# Patient Record
Sex: MALE | Race: WHITE | HISPANIC OR LATINO
[De-identification: names, ages, dates, MRNs, and addresses within clinical notes are randomized per-mention and may not be internally consistent; named-entity substitution may affect disease eponyms.]

---

## 2018-04-14 ENCOUNTER — TRANSCRIPTION ENCOUNTER (OUTPATIENT)
Age: 68
End: 2018-04-14

## 2019-09-14 ENCOUNTER — TRANSCRIPTION ENCOUNTER (OUTPATIENT)
Age: 69
End: 2019-09-14

## 2021-12-31 ENCOUNTER — EMERGENCY (EMERGENCY)
Facility: HOSPITAL | Age: 71
LOS: 1 days | Discharge: HOME | End: 2021-12-31
Attending: EMERGENCY MEDICINE
Payer: MEDICARE

## 2021-12-31 VITALS
RESPIRATION RATE: 20 BRPM | DIASTOLIC BLOOD PRESSURE: 85 MMHG | WEIGHT: 210.1 LBS | SYSTOLIC BLOOD PRESSURE: 146 MMHG | HEART RATE: 72 BPM | HEIGHT: 70 IN | OXYGEN SATURATION: 98 % | TEMPERATURE: 99 F

## 2021-12-31 VITALS
HEART RATE: 71 BPM | SYSTOLIC BLOOD PRESSURE: 113 MMHG | DIASTOLIC BLOOD PRESSURE: 61 MMHG | OXYGEN SATURATION: 98 % | TEMPERATURE: 99 F | RESPIRATION RATE: 18 BRPM

## 2021-12-31 LAB
ALBUMIN SERPL ELPH-MCNC: 4.3 G/DL — SIGNIFICANT CHANGE UP (ref 3.3–5)
ALP SERPL-CCNC: 46 U/L — SIGNIFICANT CHANGE UP (ref 40–120)
ALT FLD-CCNC: 20 U/L — SIGNIFICANT CHANGE UP (ref 10–45)
ANION GAP SERPL CALC-SCNC: 12 MMOL/L — SIGNIFICANT CHANGE UP (ref 5–17)
AST SERPL-CCNC: 19 U/L — SIGNIFICANT CHANGE UP (ref 10–40)
BASOPHILS # BLD AUTO: 0.02 K/UL — SIGNIFICANT CHANGE UP (ref 0–0.2)
BASOPHILS NFR BLD AUTO: 0.4 % — SIGNIFICANT CHANGE UP (ref 0–2)
BILIRUB SERPL-MCNC: 0.5 MG/DL — SIGNIFICANT CHANGE UP (ref 0.2–1.2)
BLD GP AB SCN SERPL QL: NEGATIVE — SIGNIFICANT CHANGE UP
BUN SERPL-MCNC: 12 MG/DL — SIGNIFICANT CHANGE UP (ref 7–23)
CALCIUM SERPL-MCNC: 8.5 MG/DL — SIGNIFICANT CHANGE UP (ref 8.4–10.5)
CHLORIDE SERPL-SCNC: 104 MMOL/L — SIGNIFICANT CHANGE UP (ref 96–108)
CO2 SERPL-SCNC: 21 MMOL/L — LOW (ref 22–31)
CREAT SERPL-MCNC: 0.79 MG/DL — SIGNIFICANT CHANGE UP (ref 0.5–1.3)
EOSINOPHIL # BLD AUTO: 0.03 K/UL — SIGNIFICANT CHANGE UP (ref 0–0.5)
EOSINOPHIL NFR BLD AUTO: 0.6 % — SIGNIFICANT CHANGE UP (ref 0–6)
GLUCOSE SERPL-MCNC: 148 MG/DL — HIGH (ref 70–99)
HCT VFR BLD CALC: 39.3 % — SIGNIFICANT CHANGE UP (ref 39–50)
HGB BLD-MCNC: 13.3 G/DL — SIGNIFICANT CHANGE UP (ref 13–17)
IMM GRANULOCYTES NFR BLD AUTO: 0.2 % — SIGNIFICANT CHANGE UP (ref 0–1.5)
LYMPHOCYTES # BLD AUTO: 1.53 K/UL — SIGNIFICANT CHANGE UP (ref 1–3.3)
LYMPHOCYTES # BLD AUTO: 29.1 % — SIGNIFICANT CHANGE UP (ref 13–44)
MCHC RBC-ENTMCNC: 30.3 PG — SIGNIFICANT CHANGE UP (ref 27–34)
MCHC RBC-ENTMCNC: 33.8 GM/DL — SIGNIFICANT CHANGE UP (ref 32–36)
MCV RBC AUTO: 89.5 FL — SIGNIFICANT CHANGE UP (ref 80–100)
MONOCYTES # BLD AUTO: 0.67 K/UL — SIGNIFICANT CHANGE UP (ref 0–0.9)
MONOCYTES NFR BLD AUTO: 12.8 % — SIGNIFICANT CHANGE UP (ref 2–14)
NEUTROPHILS # BLD AUTO: 2.99 K/UL — SIGNIFICANT CHANGE UP (ref 1.8–7.4)
NEUTROPHILS NFR BLD AUTO: 56.9 % — SIGNIFICANT CHANGE UP (ref 43–77)
NRBC # BLD: 0 /100 WBCS — SIGNIFICANT CHANGE UP (ref 0–0)
PLATELET # BLD AUTO: 151 K/UL — SIGNIFICANT CHANGE UP (ref 150–400)
POTASSIUM SERPL-MCNC: 4.2 MMOL/L — SIGNIFICANT CHANGE UP (ref 3.5–5.3)
POTASSIUM SERPL-SCNC: 4.2 MMOL/L — SIGNIFICANT CHANGE UP (ref 3.5–5.3)
PROT SERPL-MCNC: 6.7 G/DL — SIGNIFICANT CHANGE UP (ref 6–8.3)
RBC # BLD: 4.39 M/UL — SIGNIFICANT CHANGE UP (ref 4.2–5.8)
RBC # FLD: 12.3 % — SIGNIFICANT CHANGE UP (ref 10.3–14.5)
RH IG SCN BLD-IMP: NEGATIVE — SIGNIFICANT CHANGE UP
SARS-COV-2 RNA SPEC QL NAA+PROBE: DETECTED
SODIUM SERPL-SCNC: 137 MMOL/L — SIGNIFICANT CHANGE UP (ref 135–145)
WBC # BLD: 5.25 K/UL — SIGNIFICANT CHANGE UP (ref 3.8–10.5)
WBC # FLD AUTO: 5.25 K/UL — SIGNIFICANT CHANGE UP (ref 3.8–10.5)

## 2021-12-31 PROCEDURE — 86900 BLOOD TYPING SEROLOGIC ABO: CPT

## 2021-12-31 PROCEDURE — 86850 RBC ANTIBODY SCREEN: CPT

## 2021-12-31 PROCEDURE — 36415 COLL VENOUS BLD VENIPUNCTURE: CPT

## 2021-12-31 PROCEDURE — 99284 EMERGENCY DEPT VISIT MOD MDM: CPT | Mod: CS,GC

## 2021-12-31 PROCEDURE — U0003: CPT

## 2021-12-31 PROCEDURE — 99283 EMERGENCY DEPT VISIT LOW MDM: CPT

## 2021-12-31 PROCEDURE — U0005: CPT

## 2021-12-31 PROCEDURE — 80053 COMPREHEN METABOLIC PANEL: CPT

## 2021-12-31 PROCEDURE — 85025 COMPLETE CBC W/AUTO DIFF WBC: CPT

## 2021-12-31 PROCEDURE — 86901 BLOOD TYPING SEROLOGIC RH(D): CPT

## 2021-12-31 NOTE — ED ADULT NURSE NOTE - NS ED NURSE DC INFO COMPLEXITY
Reason for Call:  Home Health Care    Orders are needed for this patient. PT    PT: Continue one more time this week, and then two times per week for three weeks.      OT:     Skilled Nursing:     Pt Provider: Dr. Lee    Phone Number Homecare Nurse can be reached at: [phone]    Can we leave a detailed message on this number? YES    Phone number patient can be reached at: Home number on file [phone] (home)    Best Time: Any time    Call taken on 5/19/2020 at 10:52 AM by Jeniffer Razo     Simple: Patient demonstrates quick and easy understanding

## 2021-12-31 NOTE — ED PROVIDER NOTE - OBJECTIVE STATEMENT
72yo M h.o HTN/HLD, IDDM presenting to ED after positive COVID-19 rapid test complaining of fatigue, non-productive cough and weakness. Also complains of 2-3 days of diarrhea. Pt still ambulatory with some decreased exercise tolerance. Wife noted pt looks very pale and was worried about anemia. Pt reports one episode of dark/black loose stool 3 days ago, otherwise denies GI bleeding. Good PO intake. No n/v, fever/chills, cp or sob. Pt unvaccinated for COVID-19 (personal choice).

## 2021-12-31 NOTE — ED PROVIDER NOTE - ATTENDING CONTRIBUTION TO CARE
Keny Snyder MD, FACEP: In this physician's medical judgement based on clinical history and physical exam: patient with chief complaint of COVID-19 with fatigue, cough, and weakness. Mild symptoms, patient ambulatory, and eating and drinking. Patient without lightheadedness or fatigue and brought to emergency department secondary to familial concern for COVID-19 infection and possible anemia  ncat  non-tachycardic  non-tachypneic  no gross deformity of extremities, no asymmetry  no edema  with capacity and insight  no rash/vesicles/petechiae  will get iv, cbc, cmp, COVID-19 screen  patient with mild symptoms of COVID-19  labs without signs of anemia   Patient does not fit current guidelines for MAB  The patient was serially evaluated throughout emergency department course. There was no acute deterioration up to this time in the department. Patient has demonstrated clinical improvement and is stable, feels better at this time according to emergency department team. Agree with goals/plan of emergency department care as described in this physician's electronic medical record, including diagnostics, therapeutics and consultation as clinically warranted. Will discharge home with close outpatient follow up with primary care physician/provider and specialist if necessary. The patient and/or family was educated on concerning signs and features to return to the emergency department, in layman terms, including but not limited to: nausea, vomiting, fever, chills, persistent/worsening symptoms or any concerns at all. No immediate life threatening issues present on history, clinical exam, or any diagnostic evaluation. The patient is a safe disposition home, has capacity and insight into their condition, is ambulatory in the Emergency Department with no further questions and will follow up with their doctor(s) this week. Diagnosis, prognosis, natural history and treatment was discussed with patient and/or family. The patient and/or family were given the opportunity to ask questions and have them answered in full. The patient and/or family are with capacity and insight into the situation, treatment, risks, benefits, alternative therapies, and understand that they can ask any further questions if needed. Patient and/or family/guardian understands anticipatory guidance and was given strict return and follow up precautions. The patient and/or family/guardian has been informed, in layman terms, of all concerning signs and symptoms to return to Emergency Department, the necessity to follow up with the PMD/Clinic/follow up provided within 2-3 days was explained, and the patient and/or family/guardian reports understanding of above with capacity and insight. The patient and/or family/guardian were informed of any results of their tests and are were encouraged to follow up on the findings with their doctor as well as the need to inform their doctor of any results. The patient and/or family/guardian are aware of the need to follow up with repeat testing as applicable and report understanding of the above with capacity and insight. The patient and/or family/guardian was made aware of any pending test results at the time of discharge and of the need to call back for the final results a well as the need to inform their doctor of the results.

## 2021-12-31 NOTE — ED PROVIDER NOTE - CLINICAL SUMMARY MEDICAL DECISION MAKING FREE TEXT BOX
70yo M unvaccinated for covid now with COVID-19 positive rapid test, cough and diarrhea, congestion, wife concerned for anemia w one episode of dark stools and normal colonoscopy last year. HD stable, no hypoxia, exam remarkable for clear lungs, no tachypnea, pale mucous membraines with no icterus. Will work up for anemia, symptoms for covid are mild, given no fever/hypoxia will likely d/c with crown follow up 70yo M unvaccinated for covid now with COVID-19 positive rapid test, cough and diarrhea, congestion, wife concerned for anemia w one episode of dark stools and normal colonoscopy last year. HD stable, no hypoxia, exam remarkable for clear lungs, no tachypnea, pale mucous membranes with no icterus. Will work up for anemia, symptoms for covid are mild, given no fever/hypoxia will likely d/c with crown follow up

## 2021-12-31 NOTE — ED ADULT NURSE NOTE - OBJECTIVE STATEMENT
70 yo presents to the ED from home. A&Ox4, ambulatory c/o fatigue, non-productive cough and weakness. positive COVID-19 rapid test. Also complains of 2-3 days of diarrhea. Pt still ambulatory with some decreased exercise tolerance. Wife noted pt looks very pale and was worried about anemia. Pt reports one episode of dark/black loose stool 3 days ago, otherwise denies GI bleeding. Good PO intake. No n/v, fever/chills, cp or sob. Pt unvaccinated for COVID-19 (personal choice). 20G RAC. Patient undressed and placed into gown, call bell in hand and side rails up for safety. warm blanket provided, vital signs stable, pt in no acute distress.

## 2021-12-31 NOTE — ED PROVIDER NOTE - ATTENDING WITH...
[SOB] : shortness of breath [Dyspnea on exertion] : dyspnea during exertion [Negative] : Heme/Lymph Resident

## 2021-12-31 NOTE — ED PROVIDER NOTE - PROGRESS NOTE DETAILS
Ye Vasquez, PGY-2: Pt reexamined at bedside, resting comfortably, vitals stable. H/H stable with no metabolic derangements, remains w normal O2 sat on RA. Will d/c home with [facility] follow up, pt is agreeable.

## 2021-12-31 NOTE — ED PROVIDER NOTE - PHYSICAL EXAMINATION
GENERAL: non-toxic appearing, alert, in NAD  HEENT: Mucous membranes moist but with pallor, atraumatic, normocephalic, Vision grossly intact, no conjunctivitis or discharge, hearing grossly intact,  no nasal discharge, epistaxis   CARDIAC: RRR, normal S1S2,  no appreciable murmurs, no cyanosis, cap refill < 2 seconds  PULM: normal work of breathing, oxygen saturation on RA wnl, CTAB, no crackles, rales, rhonchi, or wheezing  GI: abdomen nondistended, soft, nontender, no guarding or rebound tenderness, no palpable masses  NEURO: awake and alert, follows commands, normal speech, PERRLA, EOMI, no focal motor or sensory deficits  MSK: spine appears normal, no joint swelling or erythema, ranging all extremities with no appreciable loss of ROM  EXT: no peripheral edema, calf tenderness, redness or swelling  SKIN: cap refill >6s, warm, dry, and intact, no rashes  PSYCH: appropriate mood and affect

## 2021-12-31 NOTE — ED PROVIDER NOTE - PATIENT PORTAL LINK FT
You can access the FollowMyHealth Patient Portal offered by [facility] by registering at the following website: [email or web address]. By joining [facility]’s FollowMyHealth portal, you will also be able to view your health information using other applications (apps) compatible with our system.

## 2021-12-31 NOTE — ED PROVIDER NOTE - NSFOLLOWUPINSTRUCTIONS_ED_ALL_ED_FT
Monitor your home oxygen levels as discussed      - Return if O2 saturation drops below 90%      - Quarantine until asymptomatic for 3 days      - Return if symptoms are not improving or if new concerning symptoms arise    Over-the-Counter Medications:     Tylenol and Advil as needed every 4 hours for Headache and Fever    You will be contacted by the [facility] program for COVID-19 follow up.

## 2022-01-01 PROBLEM — Z00.00 ENCOUNTER FOR PREVENTIVE HEALTH EXAMINATION: Status: ACTIVE | Noted: 2022-01-01

## 2022-01-05 ENCOUNTER — INPATIENT (INPATIENT)
Facility: HOSPITAL | Age: 72
LOS: 5 days | Discharge: HOME | DRG: 177 | End: 2022-01-11
Attending: HOSPITALIST | Admitting: STUDENT IN AN ORGANIZED HEALTH CARE EDUCATION/TRAINING PROGRAM
Payer: MEDICARE

## 2022-01-05 VITALS
TEMPERATURE: 99 F | RESPIRATION RATE: 18 BRPM | HEIGHT: 67 IN | WEIGHT: 210.1 LBS | HEART RATE: 96 BPM | DIASTOLIC BLOOD PRESSURE: 75 MMHG | SYSTOLIC BLOOD PRESSURE: 139 MMHG | OXYGEN SATURATION: 93 %

## 2022-01-05 DIAGNOSIS — R79.89 OTHER SPECIFIED ABNORMAL FINDINGS OF BLOOD CHEMISTRY: ICD-10-CM

## 2022-01-05 DIAGNOSIS — R19.7 DIARRHEA, UNSPECIFIED: ICD-10-CM

## 2022-01-05 DIAGNOSIS — R09.02 HYPOXEMIA: ICD-10-CM

## 2022-01-05 DIAGNOSIS — E11.9 TYPE 2 DIABETES MELLITUS WITHOUT COMPLICATIONS: ICD-10-CM

## 2022-01-05 DIAGNOSIS — R06.00 DYSPNEA, UNSPECIFIED: ICD-10-CM

## 2022-01-05 DIAGNOSIS — Z29.9 ENCOUNTER FOR PROPHYLACTIC MEASURES, UNSPECIFIED: ICD-10-CM

## 2022-01-05 LAB
ALBUMIN SERPL ELPH-MCNC: 3.9 G/DL — SIGNIFICANT CHANGE UP (ref 3.3–5)
ALP SERPL-CCNC: 46 U/L — SIGNIFICANT CHANGE UP (ref 40–120)
ALT FLD-CCNC: 16 U/L — SIGNIFICANT CHANGE UP (ref 10–45)
ANION GAP SERPL CALC-SCNC: 14 MMOL/L — SIGNIFICANT CHANGE UP (ref 5–17)
AST SERPL-CCNC: 20 U/L — SIGNIFICANT CHANGE UP (ref 10–40)
BASOPHILS # BLD AUTO: 0.01 K/UL — SIGNIFICANT CHANGE UP (ref 0–0.2)
BASOPHILS NFR BLD AUTO: 0.2 % — SIGNIFICANT CHANGE UP (ref 0–2)
BILIRUB SERPL-MCNC: 0.2 MG/DL — SIGNIFICANT CHANGE UP (ref 0.2–1.2)
BUN SERPL-MCNC: 19 MG/DL — SIGNIFICANT CHANGE UP (ref 7–23)
CALCIUM SERPL-MCNC: 8.5 MG/DL — SIGNIFICANT CHANGE UP (ref 8.4–10.5)
CHLORIDE SERPL-SCNC: 102 MMOL/L — SIGNIFICANT CHANGE UP (ref 96–108)
CO2 SERPL-SCNC: 20 MMOL/L — LOW (ref 22–31)
CREAT SERPL-MCNC: 0.96 MG/DL — SIGNIFICANT CHANGE UP (ref 0.5–1.3)
EOSINOPHIL # BLD AUTO: 0 K/UL — SIGNIFICANT CHANGE UP (ref 0–0.5)
EOSINOPHIL NFR BLD AUTO: 0 % — SIGNIFICANT CHANGE UP (ref 0–6)
GLUCOSE SERPL-MCNC: 171 MG/DL — HIGH (ref 70–99)
HCT VFR BLD CALC: 37.2 % — LOW (ref 39–50)
HGB BLD-MCNC: 12.5 G/DL — LOW (ref 13–17)
IMM GRANULOCYTES NFR BLD AUTO: 0.5 % — SIGNIFICANT CHANGE UP (ref 0–1.5)
LYMPHOCYTES # BLD AUTO: 1.49 K/UL — SIGNIFICANT CHANGE UP (ref 1–3.3)
LYMPHOCYTES # BLD AUTO: 23.1 % — SIGNIFICANT CHANGE UP (ref 13–44)
MCHC RBC-ENTMCNC: 30.4 PG — SIGNIFICANT CHANGE UP (ref 27–34)
MCHC RBC-ENTMCNC: 33.6 GM/DL — SIGNIFICANT CHANGE UP (ref 32–36)
MCV RBC AUTO: 90.5 FL — SIGNIFICANT CHANGE UP (ref 80–100)
MONOCYTES # BLD AUTO: 0.78 K/UL — SIGNIFICANT CHANGE UP (ref 0–0.9)
MONOCYTES NFR BLD AUTO: 12.1 % — SIGNIFICANT CHANGE UP (ref 2–14)
NEUTROPHILS # BLD AUTO: 4.13 K/UL — SIGNIFICANT CHANGE UP (ref 1.8–7.4)
NEUTROPHILS NFR BLD AUTO: 64.1 % — SIGNIFICANT CHANGE UP (ref 43–77)
NRBC # BLD: 0 /100 WBCS — SIGNIFICANT CHANGE UP (ref 0–0)
PLATELET # BLD AUTO: 144 K/UL — LOW (ref 150–400)
POTASSIUM SERPL-MCNC: 4 MMOL/L — SIGNIFICANT CHANGE UP (ref 3.5–5.3)
POTASSIUM SERPL-SCNC: 4 MMOL/L — SIGNIFICANT CHANGE UP (ref 3.5–5.3)
PROT SERPL-MCNC: 6.6 G/DL — SIGNIFICANT CHANGE UP (ref 6–8.3)
RBC # BLD: 4.11 M/UL — LOW (ref 4.2–5.8)
RBC # FLD: 12.1 % — SIGNIFICANT CHANGE UP (ref 10.3–14.5)
SODIUM SERPL-SCNC: 136 MMOL/L — SIGNIFICANT CHANGE UP (ref 135–145)
WBC # BLD: 6.44 K/UL — SIGNIFICANT CHANGE UP (ref 3.8–10.5)
WBC # FLD AUTO: 6.44 K/UL — SIGNIFICANT CHANGE UP (ref 3.8–10.5)

## 2022-01-05 PROCEDURE — 99285 EMERGENCY DEPT VISIT HI MDM: CPT | Mod: CS

## 2022-01-05 PROCEDURE — 99223 1ST HOSP IP/OBS HIGH 75: CPT | Mod: GC

## 2022-01-05 PROCEDURE — 71045 X-RAY EXAM CHEST 1 VIEW: CPT | Mod: 26

## 2022-01-05 RX ORDER — ENOXAPARIN SODIUM 100 MG/ML
40 INJECTION SUBCUTANEOUS EVERY 12 HOURS
Refills: 0 | Status: DISCONTINUED | OUTPATIENT
Start: 2022-01-05 | End: 2022-01-11

## 2022-01-05 RX ORDER — ATORVASTATIN CALCIUM 80 MG/1
40 TABLET, FILM COATED ORAL AT BEDTIME
Refills: 0 | Status: DISCONTINUED | OUTPATIENT
Start: 2022-01-05 | End: 2022-01-11

## 2022-01-05 RX ORDER — ACETAMINOPHEN 500 MG
650 TABLET ORAL EVERY 6 HOURS
Refills: 0 | Status: DISCONTINUED | OUTPATIENT
Start: 2022-01-05 | End: 2022-01-05

## 2022-01-05 RX ORDER — GLUCAGON INJECTION, SOLUTION 0.5 MG/.1ML
1 INJECTION, SOLUTION SUBCUTANEOUS ONCE
Refills: 0 | Status: DISCONTINUED | OUTPATIENT
Start: 2022-01-05 | End: 2022-01-11

## 2022-01-05 RX ORDER — DEXTROSE 50 % IN WATER 50 %
25 SYRINGE (ML) INTRAVENOUS ONCE
Refills: 0 | Status: DISCONTINUED | OUTPATIENT
Start: 2022-01-05 | End: 2022-01-11

## 2022-01-05 RX ORDER — REMDESIVIR 5 MG/ML
200 INJECTION INTRAVENOUS EVERY 24 HOURS
Refills: 0 | Status: COMPLETED | OUTPATIENT
Start: 2022-01-05 | End: 2022-01-05

## 2022-01-05 RX ORDER — REMDESIVIR 5 MG/ML
100 INJECTION INTRAVENOUS EVERY 24 HOURS
Refills: 0 | Status: COMPLETED | OUTPATIENT
Start: 2022-01-06 | End: 2022-01-09

## 2022-01-05 RX ORDER — REMDESIVIR 5 MG/ML
INJECTION INTRAVENOUS
Refills: 0 | Status: COMPLETED | OUTPATIENT
Start: 2022-01-06 | End: 2022-01-09

## 2022-01-05 RX ORDER — LOSARTAN POTASSIUM 100 MG/1
50 TABLET, FILM COATED ORAL DAILY
Refills: 0 | Status: DISCONTINUED | OUTPATIENT
Start: 2022-01-05 | End: 2022-01-11

## 2022-01-05 RX ORDER — INSULIN LISPRO 100/ML
VIAL (ML) SUBCUTANEOUS AT BEDTIME
Refills: 0 | Status: DISCONTINUED | OUTPATIENT
Start: 2022-01-05 | End: 2022-01-11

## 2022-01-05 RX ORDER — INSULIN LISPRO 100/ML
VIAL (ML) SUBCUTANEOUS
Refills: 0 | Status: DISCONTINUED | OUTPATIENT
Start: 2022-01-05 | End: 2022-01-11

## 2022-01-05 RX ORDER — DEXTROSE 50 % IN WATER 50 %
12.5 SYRINGE (ML) INTRAVENOUS ONCE
Refills: 0 | Status: DISCONTINUED | OUTPATIENT
Start: 2022-01-05 | End: 2022-01-11

## 2022-01-05 RX ORDER — DEXTROSE 50 % IN WATER 50 %
15 SYRINGE (ML) INTRAVENOUS ONCE
Refills: 0 | Status: DISCONTINUED | OUTPATIENT
Start: 2022-01-05 | End: 2022-01-11

## 2022-01-05 RX ORDER — ALBUTEROL 90 UG/1
2 AEROSOL, METERED ORAL EVERY 6 HOURS
Refills: 0 | Status: DISCONTINUED | OUTPATIENT
Start: 2022-01-05 | End: 2022-01-05

## 2022-01-05 RX ADMIN — ATORVASTATIN CALCIUM 40 MILLIGRAM(S): 80 TABLET, FILM COATED ORAL at 23:13

## 2022-01-05 RX ADMIN — Medication 100 MILLIGRAM(S): at 23:13

## 2022-01-05 RX ADMIN — REMDESIVIR 200 MILLIGRAM(S): 5 INJECTION INTRAVENOUS at 23:01

## 2022-01-05 NOTE — H&P ADULT - PROBLEM SELECTOR PLAN 1
Likely 2/2 moderate COVID19. CXR c/w COVID19 PNA. SpO2 >=93% on room air on admission, and amb sat normal. Maybe a component of obesity-related upper airway obstruction. Unlikely bacterial PNA or PE.  - remdesivir   - declines COVID19 vaccine for "personal reasons" including distrust/dislike of Dr. NADYA Richards  - benzonatate PRN Likely 2/2 moderate COVID19. CXR c/w COVID19 PNA. SpO2 >=93% on room air on admission, and amb sat normal. Maybe a component of obesity-related upper airway obstruction. Unlikely bacterial PNA or PE.  - remdesivir for symptomatic improvement  - declines COVID19 vaccine for "personal reasons" including distrust/dislike of Dr. NADYA Richards  - benzonatate PRN Likely 2/2 moderate COVID19. CXR c/w COVID19 PNA. SpO2 >=93% on room air on admission, and amb sat normal. Maybe a component of obesity-related upper airway obstruction. Unlikely bacterial PNA or PE.  - remdesivir for symptom control  - declines COVID19 vaccine for "personal reasons" including distrust/dislike of Dr. NADYA Richards  - benzonatate PRN Likely 2/2 moderate COVID19. CXR c/w COVID19 PNA. SpO2 >=93 on room air on admission, and amb sat normal, but 80s when asleep 1/6. Maybe a component of obesity-related upper airway obstruction. Unlikely bacterial PNA or PE.  - remdesivir for symptom control  - declines COVID19 vaccine for "personal reasons" including distrust/dislike of Dr. NADYA Richards  - benzonatate PRN  - LFNC PRN Likely 2/2 moderate COVID19. CXR c/w COVID19 PNA. SpO2 >=93 on room air on admission, and amb sat normal, but 80s when asleep 1/6. Maybe a component of obesity-related upper airway obstruction. Unlikely bacterial PNA or PE.  - remdesivir for symptom control  - patient holding off on dexamethasone to see if remdesivir improves nocturnal hypoxiA  - declines COVID19 vaccine for "personal reasons" including distrust/dislike of Dr. NADYA Richards  - benzonatate PRN  - LFNC PRN

## 2022-01-05 NOTE — H&P ADULT - NSHPPHYSICALEXAM_GEN_ALL_CORE
Vital Signs Last 24 Hrs  T(F): 98.3 (05 Jan 2022 18:54), Max: 99.6 (05 Jan 2022 17:09)  HR: 84 (05 Jan 2022 18:54) (84 - 96)  BP: 139/82 (05 Jan 2022 18:54) (116/73 - 139/82)  RR: 20 (05 Jan 2022 18:54) (18 - 20)  SpO2: 95% (05 Jan 2022 18:54) (93% - 95%)    Gen: NAD  HEENT: NCAT, no conjunctival injection, no scleral icterus, PERRL, EOMI, moist oral mucosa  Neck: no JVD, supple, no LAD, no thyromegaly  Resp: normal WOB at rest, CTAB  CV: RRR, S1 and S2, no murmurs, no rubs, no gallops, 2+ radial pulses  Abd: nondistended, bowel sounds, soft, nontender, no rebound, no involuntary guarding, no organomegaly  : Em  Ext: no clubbing, warm hands and feet, no lower extremity edema  Neuro: AxOx3  MSK: spontaneous movement of all 4 extremities, full and equal strength, no joint swelling  Psych: appropriate mood and affect  Skin: no rashes Vital Signs Last 24 Hrs  T(F): 98.3 (05 Jan 2022 18:54), Max: 99.6 (05 Jan 2022 17:09)  HR: 84 (05 Jan 2022 18:54) (84 - 96)  BP: 139/82 (05 Jan 2022 18:54) (116/73 - 139/82)  RR: 20 (05 Jan 2022 18:54) (18 - 20)  SpO2: 95% (05 Jan 2022 18:54) (93% - 95%)  Amb sat normal    Gen: NAD  HEENT: NCAT, no conjunctival injection, no scleral icterus, PERRL, EOMI, moist oral mucosa  Neck: no JVD, supple, no LAD, no thyromegaly  Resp: normal WOB at rest, CTAB  CV: RRR, S1 and S2, no murmurs, no rubs, no gallops, 2+ radial pulses  Abd: nondistended, bowel sounds, soft, nontender, no rebound, no involuntary guarding, no organomegaly  : Em  Ext: no clubbing, warm hands and feet, no lower extremity edema  Neuro: AxOx3  MSK: spontaneous movement of all 4 extremities, full and equal strength, no joint swelling  Psych: appropriate mood and affect  Skin: no rashes Vital Signs Last 24 Hrs  T(F): 98.3 (05 Jan 2022 18:54), Max: 99.6 (05 Jan 2022 17:09)  HR: 84 (05 Jan 2022 18:54) (84 - 96)  BP: 139/82 (05 Jan 2022 18:54) (116/73 - 139/82)  RR: 20 (05 Jan 2022 18:54) (18 - 20)  SpO2: 95% (05 Jan 2022 18:54) (93% - 95%)  Amb sat normal    Gen: NAD, +cap pulled low on face, +obese  HEENT: NCAT, no conjunctival injection, no scleral icterus, pupils equal and round  Neck: no JVD, supple  Resp: +increased WOB when moving, +crackles b/l  CV: RRR, S1 and S2, no murmurs, no rubs, no gallops, 2+ radial pulses  Abd: nondistended, bowel sounds, soft, nontender, no rebound, no involuntary guarding, no organomegaly  Ext: no clubbing, warm hands, no lower extremity edema  Neuro: alert, oriented x4  MSK: spontaneous movement of all 4 extremities  Psych: appropriate mood and affect Vital Signs Last 24 Hrs  T(F): 98.3 (05 Jan 2022 18:54), Max: 99.6 (05 Jan 2022 17:09)  HR: 84 (05 Jan 2022 18:54) (84 - 96)  BP: 139/82 (05 Jan 2022 18:54) (116/73 - 139/82)  RR: 20 (05 Jan 2022 18:54) (18 - 20)  SpO2: 95% (05 Jan 2022 18:54) (93% - 95%)  Amb sat normal    Gen: NAD, +cap pulled low over eyes, +obese  HEENT: NCAT, no conjunctival injection, no scleral icterus, pupils equal and round  Neck: no JVD, supple  Resp: +increased WOB when moving, +crackles b/l  CV: RRR, S1 and S2, no murmurs, no rubs, no gallops, 2+ radial pulses  Abd: nondistended, bowel sounds, soft, nontender, no rebound, no involuntary guarding, no organomegaly  Ext: no clubbing, warm hands, no lower extremity edema  Neuro: alert, oriented x4  MSK: spontaneous movement of all 4 extremities  Psych: appropriate mood and affect

## 2022-01-05 NOTE — H&P ADULT - NSHPREVIEWOFSYSTEMS_GEN_ALL_CORE
Gen: fevers, chills, vertigo  Eyes: visual changes  ENT: throat pain   Neck: pain, stiffness  Resp: cough, wheezing, hemoptysis, SOB  CV: chest pain, palpitations  GI: dysphagia, pain, nausea, vomiting, hematemesis, diarrhea, constipation, melena, hematochezia  : dysuria, frequency, hematuria  Neuro: numbness, weakness  MSK: myalgia, arthralgia  Skin: new pruritus, new rash  Psych: no SI, no depression Gen: fevers, chills, vertigo  Eyes: visual changes  ENT: throat pain   Neck: pain, stiffness  Resp: cough, wheezing, hemoptysis, SOB  CV: no chest pain, palpitations  GI: dysphagia, pain, nausea, vomiting, hematemesis, diarrhea, constipation, no melena/hematochezia  : dysuria, frequency, hematuria  Neuro: numbness, weakness  MSK: myalgia, arthralgia, no LE edema, no LE pain  Skin: new pruritus, new rash  Psych: no SI, no depression Gen: no fevers, no chills  Eyes: no visual changes  ENT: no throat pain   Resp: no hemoptysis, no apnea  CV: no chest pain, no palpitations  GI: no dysphagia, no abd pain, no nausea, no emesis/hematemesis, no constipation, no melena/hematochezia  : no dysuria, no hematuria  MSK: no myalgia, no LE pain  Skin: no new pruritus, no new rash  Psych: no SI, no depression

## 2022-01-05 NOTE — H&P ADULT - NSHPLABSRESULTS_GEN_ALL_CORE
Labs:                         12.5L <- 13.3 12/31/21   6.44  )-----------( 144L <- 151 12/31/21      ( 05 Jan 2022 18:11 )             37.2L     01-05  136  |  102  |  19  ----------------------------<  171<H>  4.0   |  20<L>  |  0.96  Ca    8.5      05 Jan 2022 18:11  TPro  6.6  /  Alb  3.9  /  TBili  0.2  /  DBili  x   /  AST  20  /  ALT  16  /  AlkPhos  46  01-05    SARS-CoV-2 PCR +    Studies and Radiology:    CXR 1/5: PRELIM: B/l peripheral patchy opacities compatible with known Covid 19 PNA.    ECG 1/5: Labs:                         12.5L <- 13.3 12/31/21   6.44  )-----------( 144L <- 151 12/31/21      ( 05 Jan 2022 18:11 )             37.2L     01-05  136  |  102  |  19  ----------------------------<  171<H>  4.0   |  20<L>  |  0.96  Ca    8.5      05 Jan 2022 18:11  TPro  6.6  /  Alb  3.9  /  TBili  0.2  /  DBili  x   /  AST  20  /  ALT  16  /  AlkPhos  46  01-05    SARS-CoV-2 PCR +    Studies and Radiology:    CXR 1/5: PRELIM: B/l peripheral patchy opacities compatible with known Covid 19 PNA.

## 2022-01-05 NOTE — H&P ADULT - ATTENDING COMMENTS
71 year old male with a hx of obesity, DM2, and HLD presents with COVID-19 pneumonia. The patient has had symptoms for the past 10 days which consist of watery diarrhea, dry cough, generalized weakness and fatigue. He tested positive on 12/31/21 and has been isolating at home; however, he comes to the ED after home SpO2 reading showed a value down to the 70s. The patient lives in a household with his wife who is vaccinated but tested positive for COVID.   The patient was evaluated at bedside. He is alert and awake, laying in bed and breathing comfortably but with frequent dry-cough. Moments earlier, he had de-saturated to the mid-80s while asleep, improved now to the mid 90s on room air while awake. He denied experiencing any dyspnea when his SpO2 was low. The patient’s exam was significant for crackles bilaterally but otherwise benign cardiac exam and overall appeared euvolemic.   Patient placed on nasal canula supplementation overnight as patient may de-saturate again while asleep; please re-assess in the AM need for supplemental oxygen. Discussed with the patient that if he is persistent hypoxic he would need treatment with steroids. The patient would like to defer this for now and re-assess during the day, since he has received remdesivir earlier.  Plan:  -continue remdesivir   -reassess in the AM need for supplemental O2. If persistent hypoxic, would initiate dexamethasone treatment  -discussed initiating dexamethasone overnight; however, the patient declined. He would be open to it if he is persistent hypoxic during the day  -rest of plan per resident note 71 year old male with a hx of obesity, DM2, and HLD presents with COVID-19 pneumonia. The patient has had symptoms for the past 10 days which consist of watery diarrhea, dry cough, generalized weakness and fatigue. He tested positive on 12/31/21 and has been isolating at home; however, he comes to the ED after home SpO2 reading showed a value down to the 70s. The patient lives in a household with his wife who is vaccinated but tested positive for COVID.     The patient was evaluated at bedside. He is alert and awake, laying in bed and breathing comfortably but with frequent dry-cough. Moments earlier, he had de-saturated to the mid-80s while asleep, improved now to the mid 90s on room air while awake. He denied experiencing any dyspnea when his SpO2 was low. The patient’s exam was significant for crackles bilaterally but otherwise benign cardiac exam and overall appeared euvolemic.   Patient placed on nasal canula supplementation overnight as patient may de-saturate again while asleep; please re-assess in the AM need for supplemental oxygen. Discussed with the patient that if he is persistent hypoxic he would need treatment with steroids. The patient would like to defer this for now and re-assess during the day, since he has received remdesivir earlier.    Plan:  -obtain baseline LDH, procalcitonin, CPK, CRP, ferritin, d-dimerPT, PTT, and EKG  -continue remdesivir   -reassess in the AM need for supplemental O2. If persistent hypoxic, would initiate dexamethasone treatment  -low suspicion for superimposed bacterial pneumonia; agree with holding off on antibiotics  -discussed initiating dexamethasone overnight; however, the patient declined. He would be open to it if he is persistent hypoxic during the day  -supportive care with PRN acetaminophen, tessalon perles  -DVT ppx  -rest of plan per resident note

## 2022-01-05 NOTE — H&P ADULT - PROBLEM SELECTOR PLAN 4
- ISS and FS  - holding home metformin, irbesartan, and rosuvastatin - ISS and FS  - holding home metformin  - home irbesartan and rosuvastatin

## 2022-01-05 NOTE — PATIENT PROFILE ADULT - FALL HARM RISK - UNIVERSAL INTERVENTIONS
Bed in lowest position, wheels locked, appropriate side rails in place/Call bell, personal items and telephone in reach/Instruct patient to call for assistance before getting out of bed or chair/Non-slip footwear when patient is out of bed/[place] to call system/Physically safe environment - no spills, clutter or unnecessary equipment/Purposeful Proactive Rounding/Room/bathroom lighting operational, light cord in reach

## 2022-01-05 NOTE — ED PROVIDER NOTE - NS ED ROS FT
GENERAL: No fever or chills, weight changes, nightsweats  EYES: no change in vision  HEENT: no dysplasia, odynophagia, ear pain, rhinorrhea, epistasis   CARDIAC: no chest pain, palpitation   PULMONARY: HPI   GI: no abdominal pain, no nausea or no vomiting, no constipation  : No changes in urination for pain/freq.   SKIN: no rashes, abnormal bruising or bleeding  NEURO: no headache, numbness/tingling, extremity weakness   MSK: No joint pain

## 2022-01-05 NOTE — ED PROVIDER NOTE - OBJECTIVE STATEMENT
71M, h.o DM2, p.w hypoxic in 70s this morning in bed. covid positive on 12/30. unvaccinated. fever, cough and shortness of breath. no chest pain or LE edema or pain. mild diarrhea no blood.

## 2022-01-05 NOTE — H&P ADULT - NSHPSOCIALHISTORY_GEN_ALL_CORE
- never smoker - denies recreational drug use  - retired OT  - lives with wife RN at [facility]

## 2022-01-05 NOTE — ED PROVIDER NOTE - PHYSICAL EXAMINATION
General: NAD, good hygiene, well developed  HENT: Atraumatic, EOMI, no conjunctivae injection, moist mucosa.  Neck: normal ROM and trachea midline   Cardiovascular: RRR, S1&2, no M or R, radial pulses equal and b/l  Respiratory: CTABL, sat 95% on RA, no wheezes or crackles, no decreased breath sounds  Abdominal: soft and non-tender non distended, neg for guarding, no CVA tenderness   Extremities: no edema of the legs/feet, distal pulses equal and b/l   Skin: warm, well perfused  Neurologic: nonfocal, AAOx3, following commands   Psych: normal mood and affect

## 2022-01-05 NOTE — H&P ADULT - PROBLEM SELECTOR PLAN 5
- code: full  - VTE ppx: enoxoparin 40 Q12H  - diet: CC  - contact: Shira Memoracion [phone]  - pharmacy: [facility] in chart  - PCP: Dasha Murphy [phone]  - vaccinated against flu

## 2022-01-05 NOTE — ED ADULT NURSE NOTE - CAS EDP DISCH DISPOSITION ADMI
Medication(s) Requested: Lorazepam   Last office visit: 1/2/19  Next Visit: None  Last dispensed: 3/21/2018  Qty: 30  PDMP reviewed     Routed to Provider to approve if appropriate      [facility] [place]

## 2022-01-05 NOTE — H&P ADULT - ASSESSMENT
71M with unvaccinated SARS-CoV-2 PCR+, obesity, DMT2, and HLD. Presented for SpO2 70s 1/5. Stable on remdesivir. 71M with unvaccinated SARS-CoV-2 PCR+, obesity, DMT2, and HLD. Presented for SpO2 70s. Stable on remdesivir.

## 2022-01-05 NOTE — H&P ADULT - HISTORY OF PRESENT ILLNESS
71M with obesity, DMT2,     ED rx: none    #Hypoxia  May be 2/2 COVID19 or WIN.  -     #DM  - ISS and FS    #Ppx  - code: full  - VTE ppx:   - diet: CC  - COVID19 vaccinated  - PT consulted  - pharmacy:   - PCP:   - contact: Jennifer [phone] 71M with obesity, DMT2,     ED rx: none    #Hypoxia  May be 2/2 COVID19 or WIN. Amb sat normal.  -     #DM  - ISS and FS    #Ppx  - code: full  - VTE ppx:   - diet: CC  - COVID19 vaccinated  - PT consulted  - pharmacy:   - PCP:   - contact: Unk [phone] 71M with unvaccinated SARS-CoV-2+ 12/30/21, obesity, DMT2,   Presented for SpO2 70s 1/5 AM in bed. Endorses fever, cough, dyspnea, and semi-solid diarrhea.    ED rx: none    #Hypoxia  May be 2/2 COVID19 or WIN. Amb sat normal. Unlikely PE.  -   - COVID19 unvaccinated    #DM  - ISS and FS    #Ppx  - code: full  - VTE ppx:   - diet: CC  - PT consulted  - pharmacy:   - PCP:   - contact: Unk [phone] 71M with unvaccinated SARS-CoV-2+ 12/31/21, obesity, DMT2,   Presented for SpO2 70s 1/5 AM in bed. Endorses fever, cough, dyspnea, and semi-solid diarrhea.    ED rx: none    #Hypoxia  May be 2/2 moderate COVID19 or WIN. SpO2 >=93% on room air on admission, and amb sat normal. Unlikely PE.  - remdesivir   - COVID19 unvaccinated    #DM  - ISS and FS  - holding home metformin, irbesartan, and rosuvastatin    #Ppx  - code: full  - VTE ppx: enoxoparin 40 Q12H  - diet: CC  - contact: Unk [phone], Shira Memoracion [phone]  - pharmacy:   - PCP: Dasha Murphy [phone] 71M with unvaccinated SARS-CoV-2 PCR+ 12/31/21, obesity, DMT2, and HLD.  Presented for SpO2 70s upon waking up 1/5. Endorses dyspnea, dry cough, wheezing, generalized weakness, fatigue, vertigo, and decreased PO intake. Also has had watery diarrhea x10d last 1/5 AM.    ED rx: none 71M with unvaccinated SARS-CoV-2 PCR+ 12/31/21, obesity, DMT2, and HLD.    Presented for SpO2 70s upon waking up 1/5. Endorses dyspnea, dry cough, wheezing, generalized weakness, fatigue, vertigo, and decreased PO intake. Also has had watery diarrhea x10d last 1/5 AM. Went to [place] this AM but decided to come to [facility] because [place] did not offer enough treatment.    ED rx: none 71M with unvaccinated SARS-CoV-2 PCR+ 12/31/21, obesity, DMT2, and HLD.    Presented for SpO2 70s upon waking up 1/5. Endorses dyspnea, dry cough, wheezing, generalized weakness, fatigue, vertigo, and decreased PO intake. Also has had watery diarrhea x10d last 1/5 AM. Went to [place] this AM but decided to come to [facility] because [place] did not offer enough treatment. Wife is also SARS-CoV-2 PCR+.    ED rx: none

## 2022-01-05 NOTE — ED PROVIDER NOTE - CLINICAL SUMMARY MEDICAL DECISION MAKING FREE TEXT BOX
hypoxic in unvaccinated covid patient. cough no LE edema, chest pain. nontoxic appearing, NAD, sat 95% on RA. hypoxic to 70s at home today. covid on 12/30. history and physical non consistent with aortic dissection, esophageal perforation, or cardiac tamponade. will admit for observation and oxygen. will get comprehensive labs to evaluate for hematologic abnormality, renal function, electrolyte derangement for possible symptom etiology.

## 2022-01-06 LAB
ALBUMIN SERPL ELPH-MCNC: 3.9 G/DL — SIGNIFICANT CHANGE UP (ref 3.3–5)
ALP SERPL-CCNC: 44 U/L — SIGNIFICANT CHANGE UP (ref 40–120)
ALT FLD-CCNC: 15 U/L — SIGNIFICANT CHANGE UP (ref 10–45)
ANION GAP SERPL CALC-SCNC: 14 MMOL/L — SIGNIFICANT CHANGE UP (ref 5–17)
APTT BLD: 30.6 SEC — SIGNIFICANT CHANGE UP (ref 27.5–35.5)
AST SERPL-CCNC: 22 U/L — SIGNIFICANT CHANGE UP (ref 10–40)
BILIRUB SERPL-MCNC: 0.3 MG/DL — SIGNIFICANT CHANGE UP (ref 0.2–1.2)
BUN SERPL-MCNC: 15 MG/DL — SIGNIFICANT CHANGE UP (ref 7–23)
CALCIUM SERPL-MCNC: 8.4 MG/DL — SIGNIFICANT CHANGE UP (ref 8.4–10.5)
CHLORIDE SERPL-SCNC: 104 MMOL/L — SIGNIFICANT CHANGE UP (ref 96–108)
CK SERPL-CCNC: 193 U/L — SIGNIFICANT CHANGE UP (ref 30–200)
CO2 SERPL-SCNC: 21 MMOL/L — LOW (ref 22–31)
CREAT SERPL-MCNC: 1.22 MG/DL — SIGNIFICANT CHANGE UP (ref 0.5–1.3)
CRP SERPL-MCNC: 88 MG/L — HIGH (ref 0–4)
D DIMER BLD IA.RAPID-MCNC: 153 NG/ML DDU — SIGNIFICANT CHANGE UP
FERRITIN SERPL-MCNC: 1347 NG/ML — HIGH (ref 30–400)
GLUCOSE BLDC GLUCOMTR-MCNC: 189 MG/DL — HIGH (ref 70–99)
GLUCOSE BLDC GLUCOMTR-MCNC: 269 MG/DL — HIGH (ref 70–99)
GLUCOSE BLDC GLUCOMTR-MCNC: 373 MG/DL — HIGH (ref 70–99)
GLUCOSE SERPL-MCNC: 171 MG/DL — HIGH (ref 70–99)
HCT VFR BLD CALC: 38.6 % — LOW (ref 39–50)
HCV AB S/CO SERPL IA: 0.37 S/CO — SIGNIFICANT CHANGE UP (ref 0–0.99)
HCV AB SERPL-IMP: SIGNIFICANT CHANGE UP
HGB BLD-MCNC: 12.7 G/DL — LOW (ref 13–17)
INR BLD: 1.1 RATIO — SIGNIFICANT CHANGE UP (ref 0.88–1.16)
LDH SERPL L TO P-CCNC: 281 U/L — HIGH (ref 50–242)
MCHC RBC-ENTMCNC: 30.1 PG — SIGNIFICANT CHANGE UP (ref 27–34)
MCHC RBC-ENTMCNC: 32.9 GM/DL — SIGNIFICANT CHANGE UP (ref 32–36)
MCV RBC AUTO: 91.5 FL — SIGNIFICANT CHANGE UP (ref 80–100)
NRBC # BLD: 0 /100 WBCS — SIGNIFICANT CHANGE UP (ref 0–0)
PLATELET # BLD AUTO: 149 K/UL — LOW (ref 150–400)
POTASSIUM SERPL-MCNC: 4.4 MMOL/L — SIGNIFICANT CHANGE UP (ref 3.5–5.3)
POTASSIUM SERPL-SCNC: 4.4 MMOL/L — SIGNIFICANT CHANGE UP (ref 3.5–5.3)
PROCALCITONIN SERPL-MCNC: 0.1 NG/ML — SIGNIFICANT CHANGE UP (ref 0.02–0.1)
PROT SERPL-MCNC: 6.5 G/DL — SIGNIFICANT CHANGE UP (ref 6–8.3)
PROTHROM AB SERPL-ACNC: 13.1 SEC — SIGNIFICANT CHANGE UP (ref 10.6–13.6)
RBC # BLD: 4.22 M/UL — SIGNIFICANT CHANGE UP (ref 4.2–5.8)
RBC # FLD: 12.2 % — SIGNIFICANT CHANGE UP (ref 10.3–14.5)
SODIUM SERPL-SCNC: 139 MMOL/L — SIGNIFICANT CHANGE UP (ref 135–145)
WBC # BLD: 6.38 K/UL — SIGNIFICANT CHANGE UP (ref 3.8–10.5)
WBC # FLD AUTO: 6.38 K/UL — SIGNIFICANT CHANGE UP (ref 3.8–10.5)

## 2022-01-06 PROCEDURE — 93010 ELECTROCARDIOGRAM REPORT: CPT

## 2022-01-06 PROCEDURE — 99233 SBSQ HOSP IP/OBS HIGH 50: CPT | Mod: GC

## 2022-01-06 RX ORDER — SODIUM CHLORIDE 9 MG/ML
1000 INJECTION INTRAMUSCULAR; INTRAVENOUS; SUBCUTANEOUS
Refills: 0 | Status: DISCONTINUED | OUTPATIENT
Start: 2022-01-06 | End: 2022-01-07

## 2022-01-06 RX ORDER — DEXAMETHASONE 0.5 MG/5ML
6 ELIXIR ORAL DAILY
Refills: 0 | Status: DISCONTINUED | OUTPATIENT
Start: 2022-01-06 | End: 2022-01-10

## 2022-01-06 RX ADMIN — Medication 1: at 13:12

## 2022-01-06 RX ADMIN — ENOXAPARIN SODIUM 40 MILLIGRAM(S): 100 INJECTION SUBCUTANEOUS at 18:49

## 2022-01-06 RX ADMIN — LOSARTAN POTASSIUM 50 MILLIGRAM(S): 100 TABLET, FILM COATED ORAL at 06:58

## 2022-01-06 RX ADMIN — Medication 6 MILLIGRAM(S): at 13:01

## 2022-01-06 RX ADMIN — Medication 1: at 08:57

## 2022-01-06 RX ADMIN — ENOXAPARIN SODIUM 40 MILLIGRAM(S): 100 INJECTION SUBCUTANEOUS at 06:58

## 2022-01-06 RX ADMIN — Medication 3: at 18:02

## 2022-01-06 RX ADMIN — ATORVASTATIN CALCIUM 40 MILLIGRAM(S): 80 TABLET, FILM COATED ORAL at 22:13

## 2022-01-06 RX ADMIN — SODIUM CHLORIDE 75 MILLILITER(S): 9 INJECTION INTRAMUSCULAR; INTRAVENOUS; SUBCUTANEOUS at 13:01

## 2022-01-06 RX ADMIN — SODIUM CHLORIDE 75 MILLILITER(S): 9 INJECTION INTRAMUSCULAR; INTRAVENOUS; SUBCUTANEOUS at 11:22

## 2022-01-06 RX ADMIN — REMDESIVIR 500 MILLIGRAM(S): 5 INJECTION INTRAVENOUS at 18:04

## 2022-01-06 RX ADMIN — Medication 3: at 22:14

## 2022-01-06 NOTE — PROGRESS NOTE ADULT - SUBJECTIVE AND OBJECTIVE BOX
Patient is a 71y old  Male who presents with a chief complaint of hypoxia, COVID19 (05 Jan 2022 19:45)      SUBJECTIVE / OVERNIGHT EVENTS: Patient seen and examined at bedside.    MEDICATIONS  (STANDING):  atorvastatin 40 milliGRAM(s) Oral at bedtime  dextrose 40% Gel 15 Gram(s) Oral once  dextrose 50% Injectable 25 Gram(s) IV Push once  dextrose 50% Injectable 12.5 Gram(s) IV Push once  dextrose 50% Injectable 25 Gram(s) IV Push once  enoxaparin Injectable 40 milliGRAM(s) SubCutaneous every 12 hours  glucagon  Injectable 1 milliGRAM(s) IntraMuscular once  insulin lispro (ADMELOG) corrective regimen sliding scale   SubCutaneous three times a day before meals  insulin lispro (ADMELOG) corrective regimen sliding scale   SubCutaneous at bedtime  losartan 50 milliGRAM(s) Oral daily  remdesivir  IVPB   IV Intermittent   remdesivir  IVPB 100 milliGRAM(s) IV Intermittent every 24 hours    MEDICATIONS  (PRN):  benzonatate 200 milliGRAM(s) Oral three times a day PRN Cough      Vital Signs Last 24 Hrs  T(C): 37.4 (06 Jan 2022 05:09), Max: 99.6 (05 Jan 2022 17:09)  T(F): 99.3 (06 Jan 2022 05:09), Max: 211.2 (05 Jan 2022 17:09)  HR: 76 (06 Jan 2022 05:09) (76 - 96)  BP: 115/71 (06 Jan 2022 05:09) (115/71 - 149/82)  BP(mean): --  RR: 20 (06 Jan 2022 05:09) (18 - 20)  SpO2: 100% (06 Jan 2022 05:09) (93% - 100%)  CAPILLARY BLOOD GLUCOSE      POCT Blood Glucose.: 198 mg/dL (05 Jan 2022 23:05)    I&O's Summary    05 Jan 2022 07:01  -  06 Jan 2022 07:00  --------------------------------------------------------  IN: 240 mL / OUT: 0 mL / NET: 240 mL        PHYSICAL EXAM:      LABS:                        12.7   6.38  )-----------( 149      ( 06 Jan 2022 06:37 )             38.6     01-06    139  |  104  |  15  ----------------------------<  171<H>  4.4   |  21<L>  |  1.22    Ca    8.4      06 Jan 2022 06:33    TPro  6.5  /  Alb  3.9  /  TBili  0.3  /  DBili  x   /  AST  22  /  ALT  15  /  AlkPhos  44  01-06    PT/INR - ( 06 Jan 2022 06:35 )   PT: 13.1 sec;   INR: 1.10 ratio         PTT - ( 06 Jan 2022 06:35 )  PTT:30.6 sec  CARDIAC MARKERS ( 06 Jan 2022 06:33 )  x     / x     / 193 U/L / x     / x              RADIOLOGY & ADDITIONAL TESTS:    Imaging Personally Reviewed:    Consultant(s) Notes Reviewed:      Care Discussed with Consultants/Other Providers:    Caity Don, PGY-1; [facility] Pager: [phone]; [facility] Pager: [phone]   Patient is a 71y old  Male who presents with a chief complaint of hypoxia, COVID19 (05 Jan 2022 19:45)      SUBJECTIVE / OVERNIGHT EVENTS: Patient seen and examined at bedside.    MEDICATIONS  (STANDING):  atorvastatin 40 milliGRAM(s) Oral at bedtime  dextrose 40% Gel 15 Gram(s) Oral once  dextrose 50% Injectable 25 Gram(s) IV Push once  dextrose 50% Injectable 12.5 Gram(s) IV Push once  dextrose 50% Injectable 25 Gram(s) IV Push once  enoxaparin Injectable 40 milliGRAM(s) SubCutaneous every 12 hours  glucagon  Injectable 1 milliGRAM(s) IntraMuscular once  insulin lispro (ADMELOG) corrective regimen sliding scale   SubCutaneous three times a day before meals  insulin lispro (ADMELOG) corrective regimen sliding scale   SubCutaneous at bedtime  losartan 50 milliGRAM(s) Oral daily  remdesivir  IVPB   IV Intermittent   remdesivir  IVPB 100 milliGRAM(s) IV Intermittent every 24 hours    MEDICATIONS  (PRN):  benzonatate 200 milliGRAM(s) Oral three times a day PRN Cough      Vital Signs Last 24 Hrs  T(C): 37.4 (06 Jan 2022 05:09), Max: 99.6 (05 Jan 2022 17:09)  T(F): 99.3 (06 Jan 2022 05:09), Max: 211.2 (05 Jan 2022 17:09)  HR: 76 (06 Jan 2022 05:09) (76 - 96)  BP: 115/71 (06 Jan 2022 05:09) (115/71 - 149/82)  BP(mean): --  RR: 20 (06 Jan 2022 05:09) (18 - 20)  SpO2: 100% (06 Jan 2022 05:09) (93% - 100%)  CAPILLARY BLOOD GLUCOSE      POCT Blood Glucose.: 198 mg/dL (05 Jan 2022 23:05)    I&O's Summary    05 Jan 2022 07:01  -  06 Jan 2022 07:00  --------------------------------------------------------  IN: 240 mL / OUT: 0 mL / NET: 240 mL        PHYSICAL EXAM:  GENERAL APPEARANCE: Well developed, NAD  HEENT:  PERRL, EOMI. hearing grossly intact.  NECK: Neck supple, non-tender no lymphadenopathy, masses or thyromegaly.  CARDIAC: Normal S1 and S2. no mrg. RRR  LUNGS: Clear to auscultation B/L, no rales, rhonchi, or wheezing  ABDOMEN: Soft , NTND, bowel sounds normal. No guarding or rebound.   MUSCULOSKELETAL: ROM intact.  No joint erythema or tenderness.   EXTREMITIES: No edema. Peripheral pulses intact.   NEUROLOGICAL: Non focal. Strength and sensation symmetric and intact throughout.   SKIN: Warm and dry , Well perfused  PSYCHIATRIC: AOx3 , Normal mood and affect      LABS:                        12.7   6.38  )-----------( 149      ( 06 Jan 2022 06:37 )             38.6     01-06    139  |  104  |  15  ----------------------------<  171<H>  4.4   |  21<L>  |  1.22    Ca    8.4      06 Jan 2022 06:33    TPro  6.5  /  Alb  3.9  /  TBili  0.3  /  DBili  x   /  AST  22  /  ALT  15  /  AlkPhos  44  01-06    PT/INR - ( 06 Jan 2022 06:35 )   PT: 13.1 sec;   INR: 1.10 ratio         PTT - ( 06 Jan 2022 06:35 )  PTT:30.6 sec  CARDIAC MARKERS ( 06 Jan 2022 06:33 )  x     / x     / 193 U/L / x     / x              RADIOLOGY & ADDITIONAL TESTS:    Imaging Personally Reviewed:    Consultant(s) Notes Reviewed:      Care Discussed with Consultants/Other Providers:    Caity Don, PGY-1; [facility] Pager: [phone]; [facility] Pager: [phone]

## 2022-01-06 NOTE — PROGRESS NOTE ADULT - PROBLEM SELECTOR PLAN 5
- code: full  - VTE ppx: enoxoparin 40 Q12H  - diet: CC  - contact: Shira Memoracion [phone]  - pharmacy: [facility] in chart  - PCP: Dasha Murphy [phone]  - vaccinated against flu

## 2022-01-07 LAB
A1C WITH ESTIMATED AVERAGE GLUCOSE RESULT: 7.7 % — HIGH (ref 4–5.6)
ALBUMIN SERPL ELPH-MCNC: 4 G/DL — SIGNIFICANT CHANGE UP (ref 3.3–5)
ALP SERPL-CCNC: 49 U/L — SIGNIFICANT CHANGE UP (ref 40–120)
ALT FLD-CCNC: 15 U/L — SIGNIFICANT CHANGE UP (ref 10–45)
ANION GAP SERPL CALC-SCNC: 16 MMOL/L — SIGNIFICANT CHANGE UP (ref 5–17)
AST SERPL-CCNC: 20 U/L — SIGNIFICANT CHANGE UP (ref 10–40)
BILIRUB SERPL-MCNC: 0.3 MG/DL — SIGNIFICANT CHANGE UP (ref 0.2–1.2)
BUN SERPL-MCNC: 20 MG/DL — SIGNIFICANT CHANGE UP (ref 7–23)
CALCIUM SERPL-MCNC: 8.5 MG/DL — SIGNIFICANT CHANGE UP (ref 8.4–10.5)
CHLORIDE SERPL-SCNC: 106 MMOL/L — SIGNIFICANT CHANGE UP (ref 96–108)
CO2 SERPL-SCNC: 20 MMOL/L — LOW (ref 22–31)
CREAT SERPL-MCNC: 0.81 MG/DL — SIGNIFICANT CHANGE UP (ref 0.5–1.3)
ESTIMATED AVERAGE GLUCOSE: 174 MG/DL — HIGH (ref 68–114)
GLUCOSE BLDC GLUCOMTR-MCNC: 230 MG/DL — HIGH (ref 70–99)
GLUCOSE BLDC GLUCOMTR-MCNC: 258 MG/DL — HIGH (ref 70–99)
GLUCOSE BLDC GLUCOMTR-MCNC: 269 MG/DL — HIGH (ref 70–99)
GLUCOSE BLDC GLUCOMTR-MCNC: 332 MG/DL — HIGH (ref 70–99)
GLUCOSE SERPL-MCNC: 264 MG/DL — HIGH (ref 70–99)
HCT VFR BLD CALC: 40.2 % — SIGNIFICANT CHANGE UP (ref 39–50)
HGB BLD-MCNC: 13.4 G/DL — SIGNIFICANT CHANGE UP (ref 13–17)
MCHC RBC-ENTMCNC: 30.3 PG — SIGNIFICANT CHANGE UP (ref 27–34)
MCHC RBC-ENTMCNC: 33.3 GM/DL — SIGNIFICANT CHANGE UP (ref 32–36)
MCV RBC AUTO: 91 FL — SIGNIFICANT CHANGE UP (ref 80–100)
MRSA PCR RESULT.: SIGNIFICANT CHANGE UP
NRBC # BLD: 0 /100 WBCS — SIGNIFICANT CHANGE UP (ref 0–0)
PLATELET # BLD AUTO: 161 K/UL — SIGNIFICANT CHANGE UP (ref 150–400)
POTASSIUM SERPL-MCNC: 4.3 MMOL/L — SIGNIFICANT CHANGE UP (ref 3.5–5.3)
POTASSIUM SERPL-SCNC: 4.3 MMOL/L — SIGNIFICANT CHANGE UP (ref 3.5–5.3)
PROT SERPL-MCNC: 6.9 G/DL — SIGNIFICANT CHANGE UP (ref 6–8.3)
RBC # BLD: 4.42 M/UL — SIGNIFICANT CHANGE UP (ref 4.2–5.8)
RBC # FLD: 12.1 % — SIGNIFICANT CHANGE UP (ref 10.3–14.5)
S AUREUS DNA NOSE QL NAA+PROBE: SIGNIFICANT CHANGE UP
SODIUM SERPL-SCNC: 142 MMOL/L — SIGNIFICANT CHANGE UP (ref 135–145)
WBC # BLD: 7.18 K/UL — SIGNIFICANT CHANGE UP (ref 3.8–10.5)
WBC # FLD AUTO: 7.18 K/UL — SIGNIFICANT CHANGE UP (ref 3.8–10.5)

## 2022-01-07 PROCEDURE — 99232 SBSQ HOSP IP/OBS MODERATE 35: CPT | Mod: GC

## 2022-01-07 RX ORDER — INSULIN LISPRO 100/ML
5 VIAL (ML) SUBCUTANEOUS
Refills: 0 | Status: DISCONTINUED | OUTPATIENT
Start: 2022-01-07 | End: 2022-01-07

## 2022-01-07 RX ORDER — INSULIN LISPRO 100/ML
5 VIAL (ML) SUBCUTANEOUS
Refills: 0 | Status: DISCONTINUED | OUTPATIENT
Start: 2022-01-07 | End: 2022-01-09

## 2022-01-07 RX ORDER — INSULIN LISPRO 100/ML
3 VIAL (ML) SUBCUTANEOUS
Refills: 0 | Status: DISCONTINUED | OUTPATIENT
Start: 2022-01-07 | End: 2022-01-07

## 2022-01-07 RX ORDER — INSULIN GLARGINE 100 [IU]/ML
8 INJECTION, SOLUTION SUBCUTANEOUS AT BEDTIME
Refills: 0 | Status: DISCONTINUED | OUTPATIENT
Start: 2022-01-07 | End: 2022-01-08

## 2022-01-07 RX ORDER — SODIUM CHLORIDE 9 MG/ML
1000 INJECTION, SOLUTION INTRAVENOUS
Refills: 0 | Status: DISCONTINUED | OUTPATIENT
Start: 2022-01-07 | End: 2022-01-11

## 2022-01-07 RX ADMIN — Medication 3: at 13:16

## 2022-01-07 RX ADMIN — REMDESIVIR 500 MILLIGRAM(S): 5 INJECTION INTRAVENOUS at 10:51

## 2022-01-07 RX ADMIN — Medication 6 MILLIGRAM(S): at 15:33

## 2022-01-07 RX ADMIN — ENOXAPARIN SODIUM 40 MILLIGRAM(S): 100 INJECTION SUBCUTANEOUS at 16:56

## 2022-01-07 RX ADMIN — ATORVASTATIN CALCIUM 40 MILLIGRAM(S): 80 TABLET, FILM COATED ORAL at 22:09

## 2022-01-07 RX ADMIN — Medication 2: at 08:48

## 2022-01-07 RX ADMIN — Medication 2: at 22:09

## 2022-01-07 RX ADMIN — ENOXAPARIN SODIUM 40 MILLIGRAM(S): 100 INJECTION SUBCUTANEOUS at 07:14

## 2022-01-07 RX ADMIN — LOSARTAN POTASSIUM 50 MILLIGRAM(S): 100 TABLET, FILM COATED ORAL at 07:11

## 2022-01-07 RX ADMIN — INSULIN GLARGINE 8 UNIT(S): 100 INJECTION, SOLUTION SUBCUTANEOUS at 22:09

## 2022-01-07 RX ADMIN — Medication 5 UNIT(S): at 13:16

## 2022-01-07 RX ADMIN — Medication 5 UNIT(S): at 17:36

## 2022-01-07 RX ADMIN — Medication 3: at 17:35

## 2022-01-07 NOTE — PROGRESS NOTE ADULT - PROBLEM SELECTOR PLAN 1
Likely 2/2 moderate COVID19. CXR c/w COVID19 PNA. SpO2 >=93 on room air on admission, and amb sat normal, but 80s when asleep 1/6.   - pt desatted off of oxygen while ambulating 1/6  - may have a component fo WIN, will follow up   - remdesivir 1/6-  - Dexamethasone 1/6  - Not COVID vaccinated  - benzonatate PRN  - LFNC 4L Likely 2/2 moderate COVID19. CXR c/w COVID19 PNA. SpO2 >=93 on room air on admission, and amb sat normal, but 80s when asleep 1/6.   - pt desatted off of oxygen while ambulating 1/6  - may have a component fo WIN, will follow up   - remdesivir 1/6-  - Dexamethasone 1/6  - Not COVID vaccinated  - benzonatate PRN  - LFNC 2L, desats on RA as of 1/7 Likely 2/2 moderate COVID19. CXR c/w COVID19 PNA. SpO2 >=93 on room air on admission, and amb sat normal, but 80s when asleep 1/6.   - may have a component fo WIN, will follow up   - 1/7 desats on room air, currently on 2LNC  - remdesivir 1/6-  - Dexamethasone 1/6 -  - Not COVID vaccinated  - benzonatate PRN

## 2022-01-07 NOTE — PROGRESS NOTE ADULT - SUBJECTIVE AND OBJECTIVE BOX
Patient is a 71y old  Male who presents with a chief complaint of hypoxia, COVID19 (06 Jan 2022 07:46)      SUBJECTIVE / OVERNIGHT EVENTS: Patient seen and examined at bedside.    MEDICATIONS  (STANDING):  atorvastatin 40 milliGRAM(s) Oral at bedtime  dexAMETHasone  Injectable 6 milliGRAM(s) IV Push daily  dextrose 40% Gel 15 Gram(s) Oral once  dextrose 50% Injectable 25 Gram(s) IV Push once  dextrose 50% Injectable 12.5 Gram(s) IV Push once  dextrose 50% Injectable 25 Gram(s) IV Push once  enoxaparin Injectable 40 milliGRAM(s) SubCutaneous every 12 hours  glucagon  Injectable 1 milliGRAM(s) IntraMuscular once  insulin lispro (ADMELOG) corrective regimen sliding scale   SubCutaneous three times a day before meals  insulin lispro (ADMELOG) corrective regimen sliding scale   SubCutaneous at bedtime  losartan 50 milliGRAM(s) Oral daily  remdesivir  IVPB   IV Intermittent   remdesivir  IVPB 100 milliGRAM(s) IV Intermittent every 24 hours  sodium chloride 0.9%. 1000 milliLiter(s) (75 mL/Hr) IV Continuous <Continuous>    MEDICATIONS  (PRN):  benzonatate 200 milliGRAM(s) Oral three times a day PRN Cough      Vital Signs Last 24 Hrs  T(C): 36.3 (07 Jan 2022 05:53), Max: 36.9 (06 Jan 2022 13:23)  T(F): 97.4 (07 Jan 2022 05:53), Max: 98.5 (06 Jan 2022 13:23)  HR: 60 (07 Jan 2022 05:53) (60 - 79)  BP: 129/80 (07 Jan 2022 05:53) (112/66 - 129/80)  BP(mean): --  RR: 18 (07 Jan 2022 05:53) (18 - 19)  SpO2: 95% (07 Jan 2022 05:53) (93% - 96%)  CAPILLARY BLOOD GLUCOSE      POCT Blood Glucose.: 373 mg/dL (06 Jan 2022 21:22)  POCT Blood Glucose.: 269 mg/dL (06 Jan 2022 17:35)  POCT Blood Glucose.: 189 mg/dL (06 Jan 2022 13:10)  POCT Blood Glucose.: 186 mg/dL (06 Jan 2022 08:09)    I&O's Summary    06 Jan 2022 07:01  -  07 Jan 2022 07:00  --------------------------------------------------------  IN: 0 mL / OUT: 850 mL / NET: -850 mL        PHYSICAL EXAM:  GENERAL APPEARANCE: Well developed, NAD  HEENT:  PERRL, EOMI. hearing grossly intact.  NECK: Neck supple, non-tender no lymphadenopathy, masses or thyromegaly.  CARDIAC: Normal S1 and S2. no mrg. RRR  LUNGS: Clear to auscultation B/L, no rales, rhonchi, or wheezing  ABDOMEN: Soft , NTND, bowel sounds normal. No guarding or rebound.   MUSCULOSKELETAL: ROM intact.  No joint erythema or tenderness.   EXTREMITIES: No edema. Peripheral pulses intact.   NEUROLOGICAL: Non focal. Strength and sensation symmetric and intact throughout.   SKIN: Warm and dry , Well perfused  PSYCHIATRIC: AOx3 , Normal mood and affect    LABS:                        12.7   6.38  )-----------( 149      ( 06 Jan 2022 06:37 )             38.6     01-06    139  |  104  |  15  ----------------------------<  171<H>  4.4   |  21<L>  |  1.22    Ca    8.4      06 Jan 2022 06:33    TPro  6.5  /  Alb  3.9  /  TBili  0.3  /  DBili  <0.1  /  AST  22  /  ALT  15  /  AlkPhos  44  01-06    PT/INR - ( 06 Jan 2022 06:35 )   PT: 13.1 sec;   INR: 1.10 ratio         PTT - ( 06 Jan 2022 06:35 )  PTT:30.6 sec  CARDIAC MARKERS ( 06 Jan 2022 06:33 )  x     / x     / 193 U/L / x     / x              RADIOLOGY & ADDITIONAL TESTS:    Imaging Personally Reviewed:    Consultant(s) Notes Reviewed:      Care Discussed with Consultants/Other Providers:    Caity Don, PGY-1; [facility] Pager: [phone]; [facility] Pager: [phone]

## 2022-01-07 NOTE — PROGRESS NOTE ADULT - PROBLEM SELECTOR PLAN 4
- ISS and FS  - holding home metformin  - home irbesartan and rosuvastatin - ISS and FS  - started pt insulin, 8 lantus and 5 premeal (insulin naive, will titrate up as needed)  - holding home metformin  - home irbesartan and rosuvastatin

## 2022-01-07 NOTE — PROGRESS NOTE ADULT - PROBLEM SELECTOR PLAN 5
- code: full  - VTE ppx: enoxoparin 40 Q12H  - diet: CC  - contact: Shira Memoracion [phone]  - pharmacy: [facility] in chart  - PCP: Dasha Murphy [phone]  - vaccinated against flu - code: full  - VTE ppx: enoxoparin 40 Q12H  - diet: CC  - contact: Shira Memoracion [phone] updated 1/7  - pharmacy: [facility] in chart  - PCP: Dasha Murphy [phone]  - vaccinated against flu

## 2022-01-08 LAB
A1C WITH ESTIMATED AVERAGE GLUCOSE RESULT: 7.8 % — HIGH (ref 4–5.6)
ALBUMIN SERPL ELPH-MCNC: 4 G/DL — SIGNIFICANT CHANGE UP (ref 3.3–5)
ALP SERPL-CCNC: 48 U/L — SIGNIFICANT CHANGE UP (ref 40–120)
ALT FLD-CCNC: 16 U/L — SIGNIFICANT CHANGE UP (ref 10–45)
ANION GAP SERPL CALC-SCNC: 15 MMOL/L — SIGNIFICANT CHANGE UP (ref 5–17)
APTT BLD: 33.2 SEC — SIGNIFICANT CHANGE UP (ref 27.5–35.5)
AST SERPL-CCNC: 18 U/L — SIGNIFICANT CHANGE UP (ref 10–40)
BILIRUB SERPL-MCNC: 0.3 MG/DL — SIGNIFICANT CHANGE UP (ref 0.2–1.2)
BUN SERPL-MCNC: 22 MG/DL — SIGNIFICANT CHANGE UP (ref 7–23)
CALCIUM SERPL-MCNC: 9 MG/DL — SIGNIFICANT CHANGE UP (ref 8.4–10.5)
CHLORIDE SERPL-SCNC: 105 MMOL/L — SIGNIFICANT CHANGE UP (ref 96–108)
CO2 SERPL-SCNC: 20 MMOL/L — LOW (ref 22–31)
CREAT SERPL-MCNC: 0.78 MG/DL — SIGNIFICANT CHANGE UP (ref 0.5–1.3)
ESTIMATED AVERAGE GLUCOSE: 177 MG/DL — HIGH (ref 68–114)
GLUCOSE BLDC GLUCOMTR-MCNC: 239 MG/DL — HIGH (ref 70–99)
GLUCOSE BLDC GLUCOMTR-MCNC: 315 MG/DL — HIGH (ref 70–99)
GLUCOSE BLDC GLUCOMTR-MCNC: 325 MG/DL — HIGH (ref 70–99)
GLUCOSE BLDC GLUCOMTR-MCNC: 332 MG/DL — HIGH (ref 70–99)
GLUCOSE SERPL-MCNC: 274 MG/DL — HIGH (ref 70–99)
HCT VFR BLD CALC: 39 % — SIGNIFICANT CHANGE UP (ref 39–50)
HGB BLD-MCNC: 12.9 G/DL — LOW (ref 13–17)
INR BLD: 1.23 RATIO — HIGH (ref 0.88–1.16)
MCHC RBC-ENTMCNC: 29.5 PG — SIGNIFICANT CHANGE UP (ref 27–34)
MCHC RBC-ENTMCNC: 33.1 GM/DL — SIGNIFICANT CHANGE UP (ref 32–36)
MCV RBC AUTO: 89.2 FL — SIGNIFICANT CHANGE UP (ref 80–100)
NRBC # BLD: 0 /100 WBCS — SIGNIFICANT CHANGE UP (ref 0–0)
PLATELET # BLD AUTO: 196 K/UL — SIGNIFICANT CHANGE UP (ref 150–400)
POTASSIUM SERPL-MCNC: 4.3 MMOL/L — SIGNIFICANT CHANGE UP (ref 3.5–5.3)
POTASSIUM SERPL-SCNC: 4.3 MMOL/L — SIGNIFICANT CHANGE UP (ref 3.5–5.3)
PROT SERPL-MCNC: 6.7 G/DL — SIGNIFICANT CHANGE UP (ref 6–8.3)
PROTHROM AB SERPL-ACNC: 14.6 SEC — HIGH (ref 10.6–13.6)
RBC # BLD: 4.37 M/UL — SIGNIFICANT CHANGE UP (ref 4.2–5.8)
RBC # FLD: 12 % — SIGNIFICANT CHANGE UP (ref 10.3–14.5)
SODIUM SERPL-SCNC: 140 MMOL/L — SIGNIFICANT CHANGE UP (ref 135–145)
WBC # BLD: 13.83 K/UL — HIGH (ref 3.8–10.5)
WBC # FLD AUTO: 13.83 K/UL — HIGH (ref 3.8–10.5)

## 2022-01-08 PROCEDURE — 99232 SBSQ HOSP IP/OBS MODERATE 35: CPT

## 2022-01-08 RX ORDER — INSULIN GLARGINE 100 [IU]/ML
15 INJECTION, SOLUTION SUBCUTANEOUS AT BEDTIME
Refills: 0 | Status: DISCONTINUED | OUTPATIENT
Start: 2022-01-08 | End: 2022-01-10

## 2022-01-08 RX ADMIN — LOSARTAN POTASSIUM 50 MILLIGRAM(S): 100 TABLET, FILM COATED ORAL at 05:58

## 2022-01-08 RX ADMIN — Medication 2: at 22:05

## 2022-01-08 RX ADMIN — REMDESIVIR 500 MILLIGRAM(S): 5 INJECTION INTRAVENOUS at 10:18

## 2022-01-08 RX ADMIN — Medication 5 UNIT(S): at 12:16

## 2022-01-08 RX ADMIN — Medication 4: at 17:08

## 2022-01-08 RX ADMIN — ENOXAPARIN SODIUM 40 MILLIGRAM(S): 100 INJECTION SUBCUTANEOUS at 17:08

## 2022-01-08 RX ADMIN — Medication 2: at 08:52

## 2022-01-08 RX ADMIN — Medication 5 UNIT(S): at 17:09

## 2022-01-08 RX ADMIN — Medication 6 MILLIGRAM(S): at 06:16

## 2022-01-08 RX ADMIN — INSULIN GLARGINE 15 UNIT(S): 100 INJECTION, SOLUTION SUBCUTANEOUS at 22:04

## 2022-01-08 RX ADMIN — ENOXAPARIN SODIUM 40 MILLIGRAM(S): 100 INJECTION SUBCUTANEOUS at 05:57

## 2022-01-08 RX ADMIN — Medication 4: at 12:16

## 2022-01-08 RX ADMIN — Medication 5 UNIT(S): at 08:53

## 2022-01-08 RX ADMIN — ATORVASTATIN CALCIUM 40 MILLIGRAM(S): 80 TABLET, FILM COATED ORAL at 22:04

## 2022-01-08 NOTE — PROGRESS NOTE ADULT - PROBLEM SELECTOR PLAN 1
Likely 2/2 moderate COVID19. CXR c/w COVID19 PNA. SpO2 >=93 on room air on admission, and amb sat normal, but 80s when asleep 1/6.   - may have a component fo WIN, will follow up   - 1/7 desats on room air, currently on 2LNC  - remdesivir 1/6-  - Dexamethasone 1/6 -  - Not COVID vaccinated  - benzonatate PRN, guaifenesin

## 2022-01-08 NOTE — PROGRESS NOTE ADULT - PROBLEM SELECTOR PLAN 5
- code: full  - VTE ppx: enoxoparin 40 Q12H  - diet: CC  - contact: Shira Memoracion [phone] updated 1/7  - pharmacy: [facility] in chart  - PCP: Dasha Murphy [phone]  - vaccinated against flu

## 2022-01-08 NOTE — PROGRESS NOTE ADULT - PROBLEM SELECTOR PLAN 4
- ISS and FS  - started pt insulin, 8 lantus and 5 premeal (insulin naive, will titrate up as needed)  - holding home metformin  - home irbesartan and rosuvastatin

## 2022-01-08 NOTE — PROGRESS NOTE ADULT - SUBJECTIVE AND OBJECTIVE BOX
**************************************************************  Peyton Khanna, PGY3  Internal Medicine   pager: [phone]: [phone] [facility]: [phone]  ***************************************************************    PROGRESS NOTE:     Patient is a 71y old  Male who presents with a chief complaint of hypoxia, COVID19 (07 Jan 2022 07:41)  SUBJECTIVE / OVERNIGHT EVENTS:  -No acute events overnight    ADDITIONAL REVIEW OF SYSTEMS: 10 point ROS negative except per HPI    MEDICATIONS  (STANDING):  atorvastatin 40 milliGRAM(s) Oral at bedtime  dexAMETHasone  Injectable 6 milliGRAM(s) IV Push daily  dextrose 40% Gel 15 Gram(s) Oral once  dextrose 5%. 1000 milliLiter(s) (50 mL/Hr) IV Continuous <Continuous>  dextrose 5%. 1000 milliLiter(s) (100 mL/Hr) IV Continuous <Continuous>  dextrose 50% Injectable 25 Gram(s) IV Push once  dextrose 50% Injectable 12.5 Gram(s) IV Push once  dextrose 50% Injectable 25 Gram(s) IV Push once  enoxaparin Injectable 40 milliGRAM(s) SubCutaneous every 12 hours  glucagon  Injectable 1 milliGRAM(s) IntraMuscular once  insulin glargine Injectable (LANTUS) 8 Unit(s) SubCutaneous at bedtime  insulin lispro (ADMELOG) corrective regimen sliding scale   SubCutaneous three times a day before meals  insulin lispro (ADMELOG) corrective regimen sliding scale   SubCutaneous at bedtime  insulin lispro Injectable (ADMELOG) 5 Unit(s) SubCutaneous three times a day before meals  losartan 50 milliGRAM(s) Oral daily  remdesivir  IVPB   IV Intermittent   remdesivir  IVPB 100 milliGRAM(s) IV Intermittent every 24 hours    MEDICATIONS  (PRN):  benzonatate 200 milliGRAM(s) Oral three times a day PRN Cough      CAPILLARY BLOOD GLUCOSE      POCT Blood Glucose.: 332 mg/dL (07 Jan 2022 21:53)  POCT Blood Glucose.: 269 mg/dL (07 Jan 2022 17:33)  POCT Blood Glucose.: 258 mg/dL (07 Jan 2022 13:09)  POCT Blood Glucose.: 230 mg/dL (07 Jan 2022 08:22)    I&O's Summary    07 Jan 2022 07:01  -  08 Jan 2022 07:00  --------------------------------------------------------  IN: 960 mL / OUT: 0 mL / NET: 960 mL        PHYSICAL EXAM:  Vital Signs Last 24 Hrs  T(C): 36.4 (08 Jan 2022 05:41), Max: 37 (07 Jan 2022 21:57)  T(F): 97.5 (08 Jan 2022 05:41), Max: 98.6 (07 Jan 2022 21:57)  HR: 64 (08 Jan 2022 05:41) (64 - 74)  BP: 109/66 (08 Jan 2022 05:41) (106/69 - 136/78)  BP(mean): --  RR: 18 (08 Jan 2022 05:41) (17 - 18)  SpO2: 95% (08 Jan 2022 05:41) (88% - 95%)    CONSTITUTIONAL: NAD, well-developed  RESPIRATORY: Normal respiratory effort; lungs are clear to auscultation bilaterally  CARDIOVASCULAR: Regular rate and rhythm, normal S1 and S2, no murmur/rub/gallop; No lower extremity edema; Peripheral pulses are 2+ bilaterally  ABDOMEN: Nontender to palpation, normoactive bowel sounds, no rebound/guarding; No hepatosplenomegaly  MUSCLOSKELETAL: no clubbing or cyanosis of digits; no joint swelling or tenderness to palpation  NEURO: CN 2-12 grossly intact, moves all limbs spontaneously  PSYCH: A+O to person, place, and time; affect appropriate    LABS:                        13.4   7.18  )-----------( 161      ( 07 Jan 2022 07:47 )             40.2     01-07    142  |  106  |  20  ----------------------------<  264<H>  4.3   |  20<L>  |  0.81    Ca    8.5      07 Jan 2022 07:48    TPro  6.9  /  Alb  4.0  /  TBili  0.3  /  DBili  x   /  AST  20  /  ALT  15  /  AlkPhos  49  01-07                RADIOLOGY & ADDITIONAL TESTS:  Results Reviewed:   Imaging Personally Reviewed:  Electrocardiogram Personally Reviewed:    COORDINATION OF CARE:  Care Discussed with Consultants/Other Providers [Y/N]:  Prior or Outpatient Records Reviewed [Y/N]:   **************************************************************  Peyton Khanna, PGY3  Internal Medicine   pager: [phone]: [phone] [facility]: [phone]  ***************************************************************    PROGRESS NOTE:     Patient is a 71y old  Male who presents with a chief complaint of hypoxia, COVID19 (07 Jan 2022 07:41)  SUBJECTIVE / OVERNIGHT EVENTS:  -No acute events overnight. This morning, patient reports that he feels less fatigued and weak. Endorses cough with white sputum, but denies shortness of breath, nausea , vomiting, abdominal pain, chest pain, headaches, joint pains. Tolerating po intake.     ADDITIONAL REVIEW OF SYSTEMS: 10 point ROS negative except per HPI    MEDICATIONS  (STANDING):  atorvastatin 40 milliGRAM(s) Oral at bedtime  dexAMETHasone  Injectable 6 milliGRAM(s) IV Push daily  dextrose 40% Gel 15 Gram(s) Oral once  dextrose 5%. 1000 milliLiter(s) (50 mL/Hr) IV Continuous <Continuous>  dextrose 5%. 1000 milliLiter(s) (100 mL/Hr) IV Continuous <Continuous>  dextrose 50% Injectable 25 Gram(s) IV Push once  dextrose 50% Injectable 12.5 Gram(s) IV Push once  dextrose 50% Injectable 25 Gram(s) IV Push once  enoxaparin Injectable 40 milliGRAM(s) SubCutaneous every 12 hours  glucagon  Injectable 1 milliGRAM(s) IntraMuscular once  insulin glargine Injectable (LANTUS) 8 Unit(s) SubCutaneous at bedtime  insulin lispro (ADMELOG) corrective regimen sliding scale   SubCutaneous three times a day before meals  insulin lispro (ADMELOG) corrective regimen sliding scale   SubCutaneous at bedtime  insulin lispro Injectable (ADMELOG) 5 Unit(s) SubCutaneous three times a day before meals  losartan 50 milliGRAM(s) Oral daily  remdesivir  IVPB   IV Intermittent   remdesivir  IVPB 100 milliGRAM(s) IV Intermittent every 24 hours    MEDICATIONS  (PRN):  benzonatate 200 milliGRAM(s) Oral three times a day PRN Cough      CAPILLARY BLOOD GLUCOSE      POCT Blood Glucose.: 332 mg/dL (07 Jan 2022 21:53)  POCT Blood Glucose.: 269 mg/dL (07 Jan 2022 17:33)  POCT Blood Glucose.: 258 mg/dL (07 Jan 2022 13:09)  POCT Blood Glucose.: 230 mg/dL (07 Jan 2022 08:22)    I&O's Summary    07 Jan 2022 07:01  -  08 Jan 2022 07:00  --------------------------------------------------------  IN: 960 mL / OUT: 0 mL / NET: 960 mL        PHYSICAL EXAM:  Vital Signs Last 24 Hrs  T(C): 36.4 (08 Jan 2022 05:41), Max: 37 (07 Jan 2022 21:57)  T(F): 97.5 (08 Jan 2022 05:41), Max: 98.6 (07 Jan 2022 21:57)  HR: 64 (08 Jan 2022 05:41) (64 - 74)  BP: 109/66 (08 Jan 2022 05:41) (106/69 - 136/78)  BP(mean): --  RR: 18 (08 Jan 2022 05:41) (17 - 18)  SpO2: 95% (08 Jan 2022 05:41) (88% - 95%)    PHYSICAL EXAM:  GENERAL APPEARANCE: Well developed, NAD  HEENT:  PERRL, EOMI. hearing grossly intact.  NECK: Neck supple, non-tender no lymphadenopathy, masses or thyromegaly.  CARDIAC: Normal S1 and S2. no mrg. RRR  LUNGS: Clear to auscultation B/L, no rales, rhonchi, or wheezing  ABDOMEN: Soft , NTND, bowel sounds normal. No guarding or rebound.   MUSCULOSKELETAL: ROM intact.  No joint erythema or tenderness.   EXTREMITIES: No edema. Peripheral pulses intact.   NEUROLOGICAL: Non focal. Strength and sensation symmetric and intact throughout.   SKIN: Warm and dry , Well perfused  PSYCHIATRIC: AOx3 , Normal mood and affect      LABS:                        13.4   7.18  )-----------( 161      ( 07 Jan 2022 07:47 )             40.2     01-07    142  |  106  |  20  ----------------------------<  264<H>  4.3   |  20<L>  |  0.81    Ca    8.5      07 Jan 2022 07:48    TPro  6.9  /  Alb  4.0  /  TBili  0.3  /  DBili  x   /  AST  20  /  ALT  15  /  AlkPhos  49  01-07

## 2022-01-09 LAB
ALBUMIN SERPL ELPH-MCNC: 3.9 G/DL — SIGNIFICANT CHANGE UP (ref 3.3–5)
ALP SERPL-CCNC: 50 U/L — SIGNIFICANT CHANGE UP (ref 40–120)
ALT FLD-CCNC: 16 U/L — SIGNIFICANT CHANGE UP (ref 10–45)
ANION GAP SERPL CALC-SCNC: 12 MMOL/L — SIGNIFICANT CHANGE UP (ref 5–17)
AST SERPL-CCNC: 16 U/L — SIGNIFICANT CHANGE UP (ref 10–40)
BILIRUB SERPL-MCNC: 0.4 MG/DL — SIGNIFICANT CHANGE UP (ref 0.2–1.2)
BUN SERPL-MCNC: 23 MG/DL — SIGNIFICANT CHANGE UP (ref 7–23)
CALCIUM SERPL-MCNC: 8.8 MG/DL — SIGNIFICANT CHANGE UP (ref 8.4–10.5)
CHLORIDE SERPL-SCNC: 103 MMOL/L — SIGNIFICANT CHANGE UP (ref 96–108)
CO2 SERPL-SCNC: 24 MMOL/L — SIGNIFICANT CHANGE UP (ref 22–31)
CREAT SERPL-MCNC: 0.87 MG/DL — SIGNIFICANT CHANGE UP (ref 0.5–1.3)
GLUCOSE BLDC GLUCOMTR-MCNC: 277 MG/DL — HIGH (ref 70–99)
GLUCOSE BLDC GLUCOMTR-MCNC: 280 MG/DL — HIGH (ref 70–99)
GLUCOSE BLDC GLUCOMTR-MCNC: 300 MG/DL — HIGH (ref 70–99)
GLUCOSE BLDC GLUCOMTR-MCNC: 303 MG/DL — HIGH (ref 70–99)
GLUCOSE SERPL-MCNC: 382 MG/DL — HIGH (ref 70–99)
HCT VFR BLD CALC: 39.1 % — SIGNIFICANT CHANGE UP (ref 39–50)
HGB BLD-MCNC: 13.1 G/DL — SIGNIFICANT CHANGE UP (ref 13–17)
INR BLD: 1.28 RATIO — HIGH (ref 0.88–1.16)
MCHC RBC-ENTMCNC: 30.3 PG — SIGNIFICANT CHANGE UP (ref 27–34)
MCHC RBC-ENTMCNC: 33.5 GM/DL — SIGNIFICANT CHANGE UP (ref 32–36)
MCV RBC AUTO: 90.5 FL — SIGNIFICANT CHANGE UP (ref 80–100)
NRBC # BLD: 0 /100 WBCS — SIGNIFICANT CHANGE UP (ref 0–0)
PLATELET # BLD AUTO: 226 K/UL — SIGNIFICANT CHANGE UP (ref 150–400)
POTASSIUM SERPL-MCNC: 4.5 MMOL/L — SIGNIFICANT CHANGE UP (ref 3.5–5.3)
POTASSIUM SERPL-SCNC: 4.5 MMOL/L — SIGNIFICANT CHANGE UP (ref 3.5–5.3)
PROT SERPL-MCNC: 6.6 G/DL — SIGNIFICANT CHANGE UP (ref 6–8.3)
PROTHROM AB SERPL-ACNC: 15.2 SEC — HIGH (ref 10.6–13.6)
RBC # BLD: 4.32 M/UL — SIGNIFICANT CHANGE UP (ref 4.2–5.8)
RBC # FLD: 12.1 % — SIGNIFICANT CHANGE UP (ref 10.3–14.5)
SODIUM SERPL-SCNC: 139 MMOL/L — SIGNIFICANT CHANGE UP (ref 135–145)
WBC # BLD: 14.2 K/UL — HIGH (ref 3.8–10.5)
WBC # FLD AUTO: 14.2 K/UL — HIGH (ref 3.8–10.5)

## 2022-01-09 PROCEDURE — 99232 SBSQ HOSP IP/OBS MODERATE 35: CPT

## 2022-01-09 RX ORDER — INSULIN LISPRO 100/ML
8 VIAL (ML) SUBCUTANEOUS
Refills: 0 | Status: DISCONTINUED | OUTPATIENT
Start: 2022-01-09 | End: 2022-01-10

## 2022-01-09 RX ADMIN — Medication 5 UNIT(S): at 08:49

## 2022-01-09 RX ADMIN — Medication 4: at 12:20

## 2022-01-09 RX ADMIN — Medication 3: at 17:29

## 2022-01-09 RX ADMIN — INSULIN GLARGINE 15 UNIT(S): 100 INJECTION, SOLUTION SUBCUTANEOUS at 22:11

## 2022-01-09 RX ADMIN — LOSARTAN POTASSIUM 50 MILLIGRAM(S): 100 TABLET, FILM COATED ORAL at 06:06

## 2022-01-09 RX ADMIN — Medication 6 MILLIGRAM(S): at 06:07

## 2022-01-09 RX ADMIN — Medication 1: at 22:10

## 2022-01-09 RX ADMIN — REMDESIVIR 500 MILLIGRAM(S): 5 INJECTION INTRAVENOUS at 10:15

## 2022-01-09 RX ADMIN — Medication 3: at 08:48

## 2022-01-09 RX ADMIN — ATORVASTATIN CALCIUM 40 MILLIGRAM(S): 80 TABLET, FILM COATED ORAL at 22:11

## 2022-01-09 RX ADMIN — Medication 8 UNIT(S): at 17:30

## 2022-01-09 RX ADMIN — ENOXAPARIN SODIUM 40 MILLIGRAM(S): 100 INJECTION SUBCUTANEOUS at 06:07

## 2022-01-09 RX ADMIN — ENOXAPARIN SODIUM 40 MILLIGRAM(S): 100 INJECTION SUBCUTANEOUS at 17:30

## 2022-01-09 NOTE — PROGRESS NOTE ADULT - PROBLEM SELECTOR PLAN 4
- ISS and FS  - started pt insulin, 8 lantus and 5 premeal (insulin naive, will titrate up as needed)  - holding home metformin  - home irbesartan and rosuvastatin - ISS and FS  - started pt insulin, 8 lantus and 5 premeal (insulin naive, will titrate up as needed)  - increased lantus to 15, premeal to 8 1/9  - holding home metformin  - home irbesartan and rosuvastatin

## 2022-01-09 NOTE — PROGRESS NOTE ADULT - PROBLEM SELECTOR PLAN 5
- code: full  - VTE ppx: enoxoparin 40 Q12H  - diet: CC  - contact: Shira Memoracion [phone] updated 1/7  - pharmacy: [facility] in chart  - PCP: Dasha Murphy [phone]  - vaccinated against flu

## 2022-01-09 NOTE — PROGRESS NOTE ADULT - SUBJECTIVE AND OBJECTIVE BOX
Patient is a 71y old  Male who presents with a chief complaint of hypoxia, COVID19 (08 Jan 2022 07:19)      SUBJECTIVE / OVERNIGHT EVENTS: Patient seen and examined at bedside.    MEDICATIONS  (STANDING):  atorvastatin 40 milliGRAM(s) Oral at bedtime  dexAMETHasone  Injectable 6 milliGRAM(s) IV Push daily  dextrose 40% Gel 15 Gram(s) Oral once  dextrose 5%. 1000 milliLiter(s) (50 mL/Hr) IV Continuous <Continuous>  dextrose 5%. 1000 milliLiter(s) (100 mL/Hr) IV Continuous <Continuous>  dextrose 50% Injectable 25 Gram(s) IV Push once  dextrose 50% Injectable 12.5 Gram(s) IV Push once  dextrose 50% Injectable 25 Gram(s) IV Push once  enoxaparin Injectable 40 milliGRAM(s) SubCutaneous every 12 hours  glucagon  Injectable 1 milliGRAM(s) IntraMuscular once  insulin glargine Injectable (LANTUS) 15 Unit(s) SubCutaneous at bedtime  insulin lispro (ADMELOG) corrective regimen sliding scale   SubCutaneous at bedtime  insulin lispro (ADMELOG) corrective regimen sliding scale   SubCutaneous three times a day before meals  insulin lispro Injectable (ADMELOG) 5 Unit(s) SubCutaneous three times a day before meals  losartan 50 milliGRAM(s) Oral daily  remdesivir  IVPB   IV Intermittent   remdesivir  IVPB 100 milliGRAM(s) IV Intermittent every 24 hours    MEDICATIONS  (PRN):  benzonatate 200 milliGRAM(s) Oral three times a day PRN Cough  guaiFENesin Oral Liquid (Sugar-Free) 100 milliGRAM(s) Oral every 6 hours PRN Cough      Vital Signs Last 24 Hrs  T(C): 36.7 (08 Jan 2022 20:32), Max: 36.9 (08 Jan 2022 14:32)  T(F): 98.1 (08 Jan 2022 20:32), Max: 98.5 (08 Jan 2022 14:32)  HR: 66 (08 Jan 2022 20:32) (66 - 81)  BP: 136/71 (08 Jan 2022 20:32) (103/65 - 136/71)  BP(mean): --  RR: 16 (08 Jan 2022 20:32) (16 - 16)  SpO2: 95% (08 Jan 2022 20:32) (95% - 95%)  CAPILLARY BLOOD GLUCOSE      POCT Blood Glucose.: 315 mg/dL (08 Jan 2022 21:52)  POCT Blood Glucose.: 332 mg/dL (08 Jan 2022 17:07)  POCT Blood Glucose.: 325 mg/dL (08 Jan 2022 12:11)  POCT Blood Glucose.: 239 mg/dL (08 Jan 2022 08:33)    I&O's Summary    07 Jan 2022 07:01  -  08 Jan 2022 07:00  --------------------------------------------------------  IN: 960 mL / OUT: 0 mL / NET: 960 mL    08 Jan 2022 07:01  -  09 Jan 2022 05:45  --------------------------------------------------------  IN: 0 mL / OUT: 650 mL / NET: -650 mL        PHYSICAL EXAM:  GENERAL APPEARANCE: Well developed, NAD  HEENT:  PERRL, EOMI. hearing grossly intact.  NECK: Neck supple, non-tender no lymphadenopathy, masses or thyromegaly.  CARDIAC: Normal S1 and S2. no mrg. RRR  LUNGS: Clear to auscultation B/L, no rales, rhonchi, or wheezing  ABDOMEN: Soft , NTND, bowel sounds normal. No guarding or rebound.   MUSCULOSKELETAL: ROM intact.  No joint erythema or tenderness.   EXTREMITIES: No edema. Peripheral pulses intact.   NEUROLOGICAL: Non focal. Strength and sensation symmetric and intact throughout.   SKIN: Warm and dry , Well perfused  PSYCHIATRIC: AOx3 , Normal mood and affect    LABS:                        12.9   13.83 )-----------( 196      ( 08 Jan 2022 07:04 )             39.0     01-08    140  |  105  |  22  ----------------------------<  274<H>  4.3   |  20<L>  |  0.78    Ca    9.0      08 Jan 2022 07:03    TPro  6.7  /  Alb  4.0  /  TBili  0.3  /  DBili  x   /  AST  18  /  ALT  16  /  AlkPhos  48  01-08    PT/INR - ( 08 Jan 2022 07:14 )   PT: 14.6 sec;   INR: 1.23 ratio         PTT - ( 08 Jan 2022 07:14 )  PTT:33.2 sec          RADIOLOGY & ADDITIONAL TESTS:    Imaging Personally Reviewed:    Consultant(s) Notes Reviewed:      Care Discussed with Consultants/Other Providers:    Caity Don, PGY-1; [facility] Pager: [phone]; [facility] Pager: [phone]

## 2022-01-10 ENCOUNTER — TRANSCRIPTION ENCOUNTER (OUTPATIENT)
Age: 72
End: 2022-01-10

## 2022-01-10 LAB
ALBUMIN SERPL ELPH-MCNC: 3.5 G/DL — SIGNIFICANT CHANGE UP (ref 3.3–5)
ALP SERPL-CCNC: 49 U/L — SIGNIFICANT CHANGE UP (ref 40–120)
ALT FLD-CCNC: 17 U/L — SIGNIFICANT CHANGE UP (ref 10–45)
ANION GAP SERPL CALC-SCNC: 12 MMOL/L — SIGNIFICANT CHANGE UP (ref 5–17)
APTT BLD: 28.2 SEC — SIGNIFICANT CHANGE UP (ref 27.5–35.5)
AST SERPL-CCNC: 16 U/L — SIGNIFICANT CHANGE UP (ref 10–40)
BILIRUB SERPL-MCNC: 0.4 MG/DL — SIGNIFICANT CHANGE UP (ref 0.2–1.2)
BUN SERPL-MCNC: 21 MG/DL — SIGNIFICANT CHANGE UP (ref 7–23)
CALCIUM SERPL-MCNC: 8.6 MG/DL — SIGNIFICANT CHANGE UP (ref 8.4–10.5)
CHLORIDE SERPL-SCNC: 104 MMOL/L — SIGNIFICANT CHANGE UP (ref 96–108)
CO2 SERPL-SCNC: 22 MMOL/L — SIGNIFICANT CHANGE UP (ref 22–31)
CREAT SERPL-MCNC: 0.83 MG/DL — SIGNIFICANT CHANGE UP (ref 0.5–1.3)
CRP SERPL-MCNC: 17 MG/L — HIGH (ref 0–4)
D DIMER BLD IA.RAPID-MCNC: 376 NG/ML DDU — HIGH
FERRITIN SERPL-MCNC: 2021 NG/ML — HIGH (ref 30–400)
GLUCOSE BLDC GLUCOMTR-MCNC: 241 MG/DL — HIGH (ref 70–99)
GLUCOSE SERPL-MCNC: 260 MG/DL — HIGH (ref 70–99)
HCT VFR BLD CALC: 35.9 % — LOW (ref 39–50)
HGB BLD-MCNC: 12.3 G/DL — LOW (ref 13–17)
INR BLD: 1.18 RATIO — HIGH (ref 0.88–1.16)
MAGNESIUM SERPL-MCNC: 2.2 MG/DL — SIGNIFICANT CHANGE UP (ref 1.6–2.6)
MCHC RBC-ENTMCNC: 30.3 PG — SIGNIFICANT CHANGE UP (ref 27–34)
MCHC RBC-ENTMCNC: 34.3 GM/DL — SIGNIFICANT CHANGE UP (ref 32–36)
MCV RBC AUTO: 88.4 FL — SIGNIFICANT CHANGE UP (ref 80–100)
NRBC # BLD: 0 /100 WBCS — SIGNIFICANT CHANGE UP (ref 0–0)
PHOSPHATE SERPL-MCNC: 3.4 MG/DL — SIGNIFICANT CHANGE UP (ref 2.5–4.5)
PLATELET # BLD AUTO: 199 K/UL — SIGNIFICANT CHANGE UP (ref 150–400)
POTASSIUM SERPL-MCNC: 4.1 MMOL/L — SIGNIFICANT CHANGE UP (ref 3.5–5.3)
POTASSIUM SERPL-SCNC: 4.1 MMOL/L — SIGNIFICANT CHANGE UP (ref 3.5–5.3)
PROT SERPL-MCNC: 5.7 G/DL — LOW (ref 6–8.3)
PROTHROM AB SERPL-ACNC: 14.1 SEC — HIGH (ref 10.6–13.6)
RBC # BLD: 4.06 M/UL — LOW (ref 4.2–5.8)
RBC # FLD: 12 % — SIGNIFICANT CHANGE UP (ref 10.3–14.5)
SODIUM SERPL-SCNC: 138 MMOL/L — SIGNIFICANT CHANGE UP (ref 135–145)
WBC # BLD: 12.54 K/UL — HIGH (ref 3.8–10.5)
WBC # FLD AUTO: 12.54 K/UL — HIGH (ref 3.8–10.5)

## 2022-01-10 PROCEDURE — 99232 SBSQ HOSP IP/OBS MODERATE 35: CPT | Mod: GC

## 2022-01-10 RX ORDER — METFORMIN HYDROCHLORIDE 850 MG/1
1 TABLET ORAL
Qty: 28 | Refills: 0
Start: 2022-01-10 | End: 2022-01-23

## 2022-01-10 RX ORDER — ISOPROPYL ALCOHOL, BENZOCAINE .7; .06 ML/ML; ML/ML
1 SWAB TOPICAL
Qty: 100 | Refills: 1
Start: 2022-01-10 | End: 2022-02-28

## 2022-01-10 RX ORDER — INSULIN GLARGINE 100 [IU]/ML
20 INJECTION, SOLUTION SUBCUTANEOUS AT BEDTIME
Refills: 0 | Status: DISCONTINUED | OUTPATIENT
Start: 2022-01-10 | End: 2022-01-11

## 2022-01-10 RX ORDER — ASPIRIN/CALCIUM CARB/MAGNESIUM 324 MG
1 TABLET ORAL
Qty: 30 | Refills: 0
Start: 2022-01-10 | End: 2022-02-08

## 2022-01-10 RX ORDER — DEXAMETHASONE 0.5 MG/5ML
1 ELIXIR ORAL
Qty: 6 | Refills: 0
Start: 2022-01-10 | End: 2022-01-15

## 2022-01-10 RX ORDER — DEXAMETHASONE 0.5 MG/5ML
6 ELIXIR ORAL ONCE
Refills: 0 | Status: COMPLETED | OUTPATIENT
Start: 2022-01-10 | End: 2022-01-11

## 2022-01-10 RX ORDER — SODIUM CHLORIDE 0.65 %
1 AEROSOL, SPRAY (ML) NASAL DAILY
Refills: 0 | Status: DISCONTINUED | OUTPATIENT
Start: 2022-01-10 | End: 2022-01-11

## 2022-01-10 RX ORDER — INSULIN LISPRO 100/ML
10 VIAL (ML) SUBCUTANEOUS
Refills: 0 | Status: DISCONTINUED | OUTPATIENT
Start: 2022-01-10 | End: 2022-01-11

## 2022-01-10 RX ADMIN — Medication 10 UNIT(S): at 17:40

## 2022-01-10 RX ADMIN — LOSARTAN POTASSIUM 50 MILLIGRAM(S): 100 TABLET, FILM COATED ORAL at 06:22

## 2022-01-10 RX ADMIN — Medication 10 UNIT(S): at 12:31

## 2022-01-10 RX ADMIN — ATORVASTATIN CALCIUM 40 MILLIGRAM(S): 80 TABLET, FILM COATED ORAL at 21:43

## 2022-01-10 RX ADMIN — Medication 2: at 08:35

## 2022-01-10 RX ADMIN — Medication 5: at 17:40

## 2022-01-10 RX ADMIN — INSULIN GLARGINE 20 UNIT(S): 100 INJECTION, SOLUTION SUBCUTANEOUS at 21:43

## 2022-01-10 RX ADMIN — Medication 6 MILLIGRAM(S): at 06:22

## 2022-01-10 RX ADMIN — Medication 8 UNIT(S): at 08:37

## 2022-01-10 RX ADMIN — Medication 1 SPRAY(S): at 18:41

## 2022-01-10 RX ADMIN — Medication 2: at 12:30

## 2022-01-10 RX ADMIN — ENOXAPARIN SODIUM 40 MILLIGRAM(S): 100 INJECTION SUBCUTANEOUS at 06:22

## 2022-01-10 RX ADMIN — ENOXAPARIN SODIUM 40 MILLIGRAM(S): 100 INJECTION SUBCUTANEOUS at 17:40

## 2022-01-10 RX ADMIN — Medication 2: at 21:43

## 2022-01-10 NOTE — PROVIDER CONTACT NOTE (OTHER) - RECOMMENDATIONS
Patient with hx of COVID+ from 12/31/21. Patient no longer need isolation at this time. Isolation discontinued as per hospital guidelines. No symptoms & >10 days.

## 2022-01-10 NOTE — DISCHARGE NOTE PROVIDER - NSDCMRMEDTOKEN_GEN_ALL_CORE_FT
irbesartan 150 mg oral tablet: 1 tab(s) orally once a day  metFORMIN 500 mg oral tablet: 1 tab(s) orally once a day  rosuvastatin 10 mg oral tablet: 1 tab(s) orally once a day   alcohol swabs : Apply topically to affected area 4 times a day   aspirin 81 mg oral capsule: 1 cap(s) orally once a day   dexamethasone 6 mg oral tablet: 1 tab(s) orally once a day   glucometer (per patient&#x27;s insurance): Test blood sugars four times a day. Dispense #1 glucometer.  irbesartan 150 mg oral tablet: 1 tab(s) orally once a day  lancets: 1 application subcutaneously 4 times a day   metFORMIN 1000 mg oral tablet: 1 tab(s) orally 2 times a day   rosuvastatin 10 mg oral tablet: 1 tab(s) orally once a day  test strips (per patient&#x27;s insurance): 1 application subcutaneously 4 times a day. ** Compatible with patient&#x27;s glucometer **

## 2022-01-10 NOTE — PROGRESS NOTE ADULT - PROBLEM SELECTOR PLAN 1
Likely 2/2 moderate COVID19. CXR c/w COVID19 PNA. SpO2 >=93 on room air on admission, and amb sat normal, but 80s when asleep 1/6.   - 1/7 desats on room air, currently on 2LNC  - remdesivir 1/6-  - Dexamethasone 1/6 -  - Not COVID vaccinated  - benzonatate PRN, guaifenesin Likely 2/2 moderate COVID19. CXR c/w COVID19 PNA. SpO2 >=93 on room air on admission, and amb sat normal, but 80s when asleep 1/6.   - 1/7 desats on room air, currently on 2LNC  - remdesivir 1/6-1/10  - Dexamethasone 1/6 - 1/15  - Not COVID vaccinated  - benzonatate PRN, guaifenesin

## 2022-01-10 NOTE — PROGRESS NOTE ADULT - SUBJECTIVE AND OBJECTIVE BOX
Patient is a 71y old  Male who presents with a chief complaint of hypoxia, COVID19 (09 Jan 2022 05:45)      SUBJECTIVE / OVERNIGHT EVENTS: Patient seen and examined at bedside.    MEDICATIONS  (STANDING):  atorvastatin 40 milliGRAM(s) Oral at bedtime  dexAMETHasone  Injectable 6 milliGRAM(s) IV Push daily  dextrose 40% Gel 15 Gram(s) Oral once  dextrose 5%. 1000 milliLiter(s) (50 mL/Hr) IV Continuous <Continuous>  dextrose 5%. 1000 milliLiter(s) (100 mL/Hr) IV Continuous <Continuous>  dextrose 50% Injectable 25 Gram(s) IV Push once  dextrose 50% Injectable 12.5 Gram(s) IV Push once  dextrose 50% Injectable 25 Gram(s) IV Push once  enoxaparin Injectable 40 milliGRAM(s) SubCutaneous every 12 hours  glucagon  Injectable 1 milliGRAM(s) IntraMuscular once  insulin glargine Injectable (LANTUS) 15 Unit(s) SubCutaneous at bedtime  insulin lispro (ADMELOG) corrective regimen sliding scale   SubCutaneous three times a day before meals  insulin lispro (ADMELOG) corrective regimen sliding scale   SubCutaneous at bedtime  insulin lispro Injectable (ADMELOG) 8 Unit(s) SubCutaneous three times a day before meals  losartan 50 milliGRAM(s) Oral daily    MEDICATIONS  (PRN):  benzonatate 200 milliGRAM(s) Oral three times a day PRN Cough  guaiFENesin Oral Liquid (Sugar-Free) 100 milliGRAM(s) Oral every 6 hours PRN Cough      Vital Signs Last 24 Hrs  T(C): 36.8 (10 Henry 2022 06:23), Max: 36.9 (09 Jan 2022 20:52)  T(F): 98.3 (10 Henry 2022 06:23), Max: 98.5 (09 Jan 2022 20:52)  HR: 68 (10 Henry 2022 06:23) (64 - 68)  BP: 117/78 (10 Henry 2022 06:23) (98/60 - 122/74)  BP(mean): --  RR: 18 (10 Henry 2022 06:23) (16 - 18)  SpO2: 95% (10 Henry 2022 06:23) (91% - 95%)  CAPILLARY BLOOD GLUCOSE      POCT Blood Glucose.: 300 mg/dL (09 Jan 2022 22:04)  POCT Blood Glucose.: 277 mg/dL (09 Jan 2022 17:19)  POCT Blood Glucose.: 303 mg/dL (09 Jan 2022 12:18)  POCT Blood Glucose.: 280 mg/dL (09 Jan 2022 08:41)    I&O's Summary    09 Jan 2022 07:01  -  10 Henry 2022 07:00  --------------------------------------------------------  IN: 0 mL / OUT: 450 mL / NET: -450 mL        PHYSICAL EXAM:  GENERAL APPEARANCE: Well developed, NAD  HEENT:  PERRL, EOMI. hearing grossly intact.  NECK: Neck supple, non-tender no lymphadenopathy, masses or thyromegaly.  CARDIAC: Normal S1 and S2. no mrg. RRR  LUNGS: Clear to auscultation B/L, no rales, rhonchi, or wheezing  ABDOMEN: Soft , NTND, bowel sounds normal. No guarding or rebound.   MUSCULOSKELETAL: ROM intact.  No joint erythema or tenderness.   EXTREMITIES: No edema. Peripheral pulses intact.   NEUROLOGICAL: Non focal. Strength and sensation symmetric and intact throughout.   SKIN: Warm and dry , Well perfused  PSYCHIATRIC: AOx3 , Normal mood and affect    LABS:                        13.1   14.20 )-----------( 226      ( 09 Jan 2022 11:51 )             39.1     01-09    139  |  103  |  23  ----------------------------<  382<H>  4.5   |  24  |  0.87    Ca    8.8      09 Jan 2022 11:50    TPro  6.6  /  Alb  3.9  /  TBili  0.4  /  DBili  x   /  AST  16  /  ALT  16  /  AlkPhos  50  01-09    PT/INR - ( 09 Jan 2022 11:51 )   PT: 15.2 sec;   INR: 1.28 ratio                   RADIOLOGY & ADDITIONAL TESTS:    Imaging Personally Reviewed:    Consultant(s) Notes Reviewed:      Care Discussed with Consultants/Other Providers:    Caity Don, PGY-1; [facility] Pager: [phone]; [facility] Pager: [phone]

## 2022-01-10 NOTE — CHART NOTE - NSCHARTNOTEFT_GEN_A_CORE
Home Oxygen       Patient requires home oxygen as a medical necessity. On Room Air, resting, patient Sp02 was 85%. On ambulation, room air, it was 85%. Patient Sp02 to 94% on 4LNC while ambulating. Therefore, patient requires home oxygen on discharge.     - Caity Don, PGY-1.

## 2022-01-10 NOTE — DISCHARGE NOTE PROVIDER - CARE PROVIDER_API CALL
Dasha Murphy  [address] [address]  Phone: [phone]  Fax: (   )    -  Follow Up Time:

## 2022-01-10 NOTE — DISCHARGE NOTE NURSING/CASE MANAGEMENT/SOCIAL WORK - PATIENT PORTAL LINK FT
You can access the FollowMyHealth Patient Portal offered by [facility] by registering at the following website: [email or web address]. By joining [email or web address]’s FollowMyHealth portal, you will also be able to view your health information using other applications (apps) compatible with our system.

## 2022-01-10 NOTE — DISCHARGE NOTE PROVIDER - NSDCCPCAREPLAN_GEN_ALL_CORE_FT
PRINCIPAL DISCHARGE DIAGNOSIS  Diagnosis: Hypoxia  Assessment and Plan of Treatment: You were foudn to have decreased oxygenation status on admission. This is likely due to your COVID diagnosis. You were treated with two medications called remdesivir and dexamethasone. You also required oxygen supplemenation. Starting tomorrow, 1/12, please take one dexamethasone tablet daily until 12/15. Due to the dexamethasone use, your blood sugars were monitored. We have increased your dose of metformin to 1000mg two times a day. Please take this medication for a week and follow-up with your primary care doctor to discuss down titration back to your home dose. Please also check your fingersticks at home to monitor your daily blood sugars. If your fingersticks are elevated, please call your primary care doctor to help with further control. You are also being discharged with home oxygen and have been given a [facility] program referral. This program will reach out to you and help with talkin ga pulmonary doctor/team that can help wean down on the oxygen as able. Finally, please take a baby aspirin 81mg daily for one month after discharge, as COVID can sometimes induce clotting. A prescription for your increased dose of metformin and your remainder of the dexamethasone course has been sent to your preferred pharmacy. Please follow-up with your primary care doctor within one week of discharge.      SECONDARY DISCHARGE DIAGNOSES  Diagnosis: COVID-19  Assessment and Plan of Treatment:

## 2022-01-10 NOTE — PROGRESS NOTE ADULT - PROBLEM SELECTOR PLAN 5
- code: full  - VTE ppx: enoxoparin 40 Q12H  - diet: CC  - contact: Shira Memoracion [phone] updated 1/7  - pharmacy: [facility] in chart  - PCP: Dasha Murphy [phone]  - vaccinated against flu

## 2022-01-10 NOTE — DISCHARGE NOTE NURSING/CASE MANAGEMENT/SOCIAL WORK - MODE OF TRANSPORTATION
+ abdominal pain during hospitalization  CT scan ordered showed umbilical hernia  General surgery consulted and recommended outpatient follow up    Ambulatory

## 2022-01-10 NOTE — DISCHARGE NOTE PROVIDER - NSDCFUADDAPPT_GEN_ALL_CORE_FT
The [facility] will follow-up with you to follow-up regarding your oxygen use and to help connect your with a pulmonary team.

## 2022-01-10 NOTE — DISCHARGE NOTE PROVIDER - HOSPITAL COURSE
HPI:  71M with unvaccinated SARS-CoV-2 PCR+ 12/31/21, obesity, DMT2, and HLD.    Presented for SpO2 70s upon waking up 1/5. Endorses dyspnea, dry cough, wheezing, generalized weakness, fatigue, vertigo, and decreased PO intake. Also has had watery diarrhea x10d last 1/5 AM. Went to [place] this AM but decided to come to [facility] because [place] did not offer enough treatment. Wife is also SARS-CoV-2 PCR+.      Hospital Course:  Pt was admitted for hypoxia 2/2 Covid-19 infection. HE was started on remdesevir and dexamethasone. Pt's O2 requirements were escalated to NC. Pt's O2  requirement was maintained at 4LNC. He completed his course of remdesivir, and remained on dexamethasone. Patient was also monitored for his blood glucose levels and was discharged on the remaining course of dexamethasone. He was able to breath comfortably with ambulation on 4LNC. Patient was discharged with home oxygen with follow-up.      During his hospital stay, he was anticoagulated w Lovenox 40mg and hyperglycemia 2/2 dexamethasone use was treated. HbA1c was 7.7. Patient will be discharged on 81mg aspirin for 30 days for VTE ppx. He is currently stable for discharge and requires no further medical management.    HPI:  71M with unvaccinated SARS-CoV-2 PCR+ 12/31/21, obesity, DMT2, and HLD.    Presented for SpO2 70s upon waking up 1/5. Endorses dyspnea, dry cough, wheezing, generalized weakness, fatigue, vertigo, and decreased PO intake. Also has had watery diarrhea x10d last 1/5 AM. Went to [place] this AM but decided to come to [facility] because [place] did not offer enough treatment. Wife is also SARS-CoV-2 PCR+.      Hospital Course:  Pt was admitted for hypoxia 2/2 Covid-19 infection. HE was started on remdesevir and dexamethasone. Pt's O2 requirements were escalated to NC. Pt's O2  requirement was maintained at 4LNC. He completed his course of remdesivir, and remained on dexamethasone. Patient was also monitored for his blood glucose levels and was discharged on the remaining course of dexamethasone. He was able to breath comfortably with ambulation on 4LNC. Patient was discharged with home oxygen with follow-up.      During his hospital stay, he was anticoagulated w Lovenox 40mg and hyperglycemia 2/2 dexamethasone use was treated. HbA1c was 7.7. Patient will be discharged on 81mg aspirin for 30 days for VTE ppx. Patient is also to be discharged with home oxygen and was given a [facility] referral. He is currently stable for discharge and requires no further medical management.

## 2022-01-10 NOTE — PROGRESS NOTE ADULT - PROBLEM SELECTOR PLAN 4
- ISS and FS  - started pt insulin, 8 lantus and 5 premeal (insulin naive, will titrate up as needed)  - increased lantus to 15, premeal to 8 1/9  - holding home metformin  - home irbesartan and rosuvastatin - ISS and FS  - started pt insulin, 8 lantus and 5 premeal (insulin naive, will titrate up as needed)  - increased lantus to 20, premeal to 10 1/10  - holding home metformin  - home irbesartan and rosuvastatin

## 2022-01-10 NOTE — DISCHARGE NOTE NURSING/CASE MANAGEMENT/SOCIAL WORK - NSDCPEFALRISK_GEN_ALL_CORE
For information on Fall & Injury Prevention, visit: [email or web address] OR  [email or web address] OR  https://www.cdc.gov/steadi/patient.html

## 2022-01-10 NOTE — DISCHARGE NOTE PROVIDER - PROVIDER TOKENS
FREE:[LAST:[Murphy],FIRST:[Dasha],PHONE:[phone]],FAX:[(   )    -],ADDRESS:[[address] [place] [id number]]]

## 2022-01-11 VITALS
TEMPERATURE: 98 F | SYSTOLIC BLOOD PRESSURE: 137 MMHG | OXYGEN SATURATION: 95 % | DIASTOLIC BLOOD PRESSURE: 89 MMHG | HEART RATE: 64 BPM | RESPIRATION RATE: 18 BRPM

## 2022-01-11 LAB
ALBUMIN SERPL ELPH-MCNC: 3.4 G/DL — SIGNIFICANT CHANGE UP (ref 3.3–5)
ALP SERPL-CCNC: 50 U/L — SIGNIFICANT CHANGE UP (ref 40–120)
ALT FLD-CCNC: 17 U/L — SIGNIFICANT CHANGE UP (ref 10–45)
ANION GAP SERPL CALC-SCNC: 10 MMOL/L — SIGNIFICANT CHANGE UP (ref 5–17)
AST SERPL-CCNC: 16 U/L — SIGNIFICANT CHANGE UP (ref 10–40)
BILIRUB SERPL-MCNC: 0.4 MG/DL — SIGNIFICANT CHANGE UP (ref 0.2–1.2)
BUN SERPL-MCNC: 22 MG/DL — SIGNIFICANT CHANGE UP (ref 7–23)
CALCIUM SERPL-MCNC: 8.5 MG/DL — SIGNIFICANT CHANGE UP (ref 8.4–10.5)
CHLORIDE SERPL-SCNC: 103 MMOL/L — SIGNIFICANT CHANGE UP (ref 96–108)
CO2 SERPL-SCNC: 24 MMOL/L — SIGNIFICANT CHANGE UP (ref 22–31)
CREAT SERPL-MCNC: 0.82 MG/DL — SIGNIFICANT CHANGE UP (ref 0.5–1.3)
GLUCOSE SERPL-MCNC: 292 MG/DL — HIGH (ref 70–99)
HCT VFR BLD CALC: 37 % — LOW (ref 39–50)
HGB BLD-MCNC: 12.7 G/DL — LOW (ref 13–17)
INR BLD: 1.13 RATIO — SIGNIFICANT CHANGE UP (ref 0.88–1.16)
MAGNESIUM SERPL-MCNC: 2.3 MG/DL — SIGNIFICANT CHANGE UP (ref 1.6–2.6)
MCHC RBC-ENTMCNC: 30.3 PG — SIGNIFICANT CHANGE UP (ref 27–34)
MCHC RBC-ENTMCNC: 34.3 GM/DL — SIGNIFICANT CHANGE UP (ref 32–36)
MCV RBC AUTO: 88.3 FL — SIGNIFICANT CHANGE UP (ref 80–100)
NRBC # BLD: 0 /100 WBCS — SIGNIFICANT CHANGE UP (ref 0–0)
PHOSPHATE SERPL-MCNC: 3.6 MG/DL — SIGNIFICANT CHANGE UP (ref 2.5–4.5)
PLATELET # BLD AUTO: 194 K/UL — SIGNIFICANT CHANGE UP (ref 150–400)
POTASSIUM SERPL-MCNC: 4.2 MMOL/L — SIGNIFICANT CHANGE UP (ref 3.5–5.3)
POTASSIUM SERPL-SCNC: 4.2 MMOL/L — SIGNIFICANT CHANGE UP (ref 3.5–5.3)
PROT SERPL-MCNC: 5.8 G/DL — LOW (ref 6–8.3)
PROTHROM AB SERPL-ACNC: 13.5 SEC — SIGNIFICANT CHANGE UP (ref 10.6–13.6)
RBC # BLD: 4.19 M/UL — LOW (ref 4.2–5.8)
RBC # FLD: 11.9 % — SIGNIFICANT CHANGE UP (ref 10.3–14.5)
SODIUM SERPL-SCNC: 137 MMOL/L — SIGNIFICANT CHANGE UP (ref 135–145)
WBC # BLD: 12.08 K/UL — HIGH (ref 3.8–10.5)
WBC # FLD AUTO: 12.08 K/UL — HIGH (ref 3.8–10.5)

## 2022-01-11 PROCEDURE — 82550 ASSAY OF CK (CPK): CPT

## 2022-01-11 PROCEDURE — 36415 COLL VENOUS BLD VENIPUNCTURE: CPT

## 2022-01-11 PROCEDURE — 93005 ELECTROCARDIOGRAM TRACING: CPT

## 2022-01-11 PROCEDURE — 85027 COMPLETE CBC AUTOMATED: CPT

## 2022-01-11 PROCEDURE — 84145 PROCALCITONIN (PCT): CPT

## 2022-01-11 PROCEDURE — 80053 COMPREHEN METABOLIC PANEL: CPT

## 2022-01-11 PROCEDURE — 83735 ASSAY OF MAGNESIUM: CPT

## 2022-01-11 PROCEDURE — 85610 PROTHROMBIN TIME: CPT

## 2022-01-11 PROCEDURE — 83036 HEMOGLOBIN GLYCOSYLATED A1C: CPT

## 2022-01-11 PROCEDURE — 82962 GLUCOSE BLOOD TEST: CPT

## 2022-01-11 PROCEDURE — 85379 FIBRIN DEGRADATION QUANT: CPT

## 2022-01-11 PROCEDURE — 87641 MR-STAPH DNA AMP PROBE: CPT

## 2022-01-11 PROCEDURE — 85730 THROMBOPLASTIN TIME PARTIAL: CPT

## 2022-01-11 PROCEDURE — 87640 STAPH A DNA AMP PROBE: CPT

## 2022-01-11 PROCEDURE — 83615 LACTATE (LD) (LDH) ENZYME: CPT

## 2022-01-11 PROCEDURE — 86803 HEPATITIS C AB TEST: CPT

## 2022-01-11 PROCEDURE — 84100 ASSAY OF PHOSPHORUS: CPT

## 2022-01-11 PROCEDURE — 82728 ASSAY OF FERRITIN: CPT

## 2022-01-11 PROCEDURE — 71045 X-RAY EXAM CHEST 1 VIEW: CPT

## 2022-01-11 PROCEDURE — 86140 C-REACTIVE PROTEIN: CPT

## 2022-01-11 PROCEDURE — 85025 COMPLETE CBC W/AUTO DIFF WBC: CPT

## 2022-01-11 PROCEDURE — 99285 EMERGENCY DEPT VISIT HI MDM: CPT

## 2022-01-11 PROCEDURE — 99239 HOSP IP/OBS DSCHRG MGMT >30: CPT

## 2022-01-11 PROCEDURE — 82248 BILIRUBIN DIRECT: CPT

## 2022-01-11 RX ORDER — DEXAMETHASONE 0.5 MG/5ML
6 ELIXIR ORAL DAILY
Refills: 0 | Status: DISCONTINUED | OUTPATIENT
Start: 2022-01-12 | End: 2022-01-11

## 2022-01-11 RX ADMIN — ENOXAPARIN SODIUM 40 MILLIGRAM(S): 100 INJECTION SUBCUTANEOUS at 06:21

## 2022-01-11 RX ADMIN — Medication 6 MILLIGRAM(S): at 06:20

## 2022-01-11 RX ADMIN — LOSARTAN POTASSIUM 50 MILLIGRAM(S): 100 TABLET, FILM COATED ORAL at 06:21

## 2022-01-11 RX ADMIN — Medication 10 UNIT(S): at 12:31

## 2022-01-11 RX ADMIN — Medication 10 UNIT(S): at 08:38

## 2022-01-11 RX ADMIN — Medication 3: at 08:37

## 2022-01-11 RX ADMIN — Medication 1 SPRAY(S): at 12:05

## 2022-01-11 RX ADMIN — Medication 4: at 12:30

## 2022-01-11 NOTE — PROGRESS NOTE ADULT - ASSESSMENT
71M with unvaccinated SARS-CoV-2 PCR+, obesity, DMT2, and HLD. Presented for SpO2 70s. Stable on remdesivir.
71M w/ unvaccinated SARS-CoV-2 PCR+, obesity, DMT2, and HLD, admitted for AHRF 2/2 COVID-19, started on remdesivir and decadron.
71M w/ unvaccinated SARS-CoV-2 PCR+, obesity, DMT2, and HLD, admitted for AHRF 2/2 COVID-19, started on remdesivir and decadron.
71M with unvaccinated SARS-CoV-2 PCR+, obesity, DMT2, and HLD. Presented for SpO2 70s. Stable on remdesivir.
71M w/ unvaccinated SARS-CoV-2 PCR+, obesity, DMT2, and HLD, admitted for AHRF 2/2 COVID-19, started on remdesivir and decadron.
71M w/ unvaccinated SARS-CoV-2 PCR+, obesity, DMT2, and HLD, admitted for AHRF 2/2 COVID-19, started on remdesivir and decadron.

## 2022-01-11 NOTE — PROGRESS NOTE ADULT - PROBLEM SELECTOR PLAN 4
- ISS and FS  - started pt insulin, 8 lantus and 5 premeal (insulin naive, will titrate up as needed)  - increased lantus to 20, premeal to 10 1/10  - holding home metformin  - home irbesartan and rosuvastatin

## 2022-01-11 NOTE — PROGRESS NOTE ADULT - ATTENDING COMMENTS
-Discussed with patient and his wife at bedside. -Patient says he's feeling better and ambulating ok and eating. He wants to go home.   -Patient will require 4L O2 at home upon DC. Script given to CM. This is being arranged.   -BG elevated in setting of dexamethasone and DM. For now will increase lantus to 20 units qhs and lispro to 10 units with meals. On DC, plan to increase home metformin to 1gm BID while on dexamethasone, then resume home dose once BG improved closer to baseline. His HbA1c of 7.8 suggests decent control of BG on oral meds.   -Plan for DC to home with close outpatient PMD/pulm follow up once home O2 delivered.
here w/ covid19 and acute hypoxic respiratory failure  unvaccinated  cont remdesivir and decadron  wean o2 as able
-Stable for DC to home with outpatient follow up. -36 minutes spent on the DC process.   -[place] program to be contacted.   -Discussed with patient and his wife at bedside.   -Plan to increase home metformin for the duration of dexamethasone and with glucose monitoring at home with outpatient PMD/endo follow up.   -Patient overall doing and feeling better. Ambulating. Tolerating diet. -Home O2 delivered.
Continue with remdesivir and dexamethasone  Wean down O2 as tolerated, currently on 2LNC and breathing comfortably   Continue with supportive care
Titrated down to 2LNC, but desats currently with movement and talking.  Continue with decadron.  Adjust insulin dosing, steroid induced hyperglycemia in the setting of DM.
here w/ covid19 and acute hypoxic respiratory failure  unvaccinated  now hypoxic  add decadron

## 2022-01-11 NOTE — PROGRESS NOTE ADULT - PROBLEM SELECTOR PLAN 1
Likely 2/2 moderate COVID19. CXR c/w COVID19 PNA. SpO2 >=93 on room air on admission, and amb sat normal, but 80s when asleep 1/6.   - 1/7 desats on room air, currently on 4LNC  - remdesivir 1/6-1/10  - Dexamethasone 1/6 - 1/15  - Not COVID vaccinated  - benzonatate PRN, guaifenesin

## 2022-01-11 NOTE — PROGRESS NOTE ADULT - PROBLEM SELECTOR PLAN 2
Normocytic anemia and thrombocytopenia likely 2/2 COVID19.  - monitor CBC

## 2022-01-11 NOTE — PROGRESS NOTE ADULT - REASON FOR ADMISSION
hypoxia, COVID19

## 2022-01-11 NOTE — PROGRESS NOTE ADULT - PROBLEM SELECTOR PLAN 5
- code: full  - VTE ppx: enoxoparin 40 Q12H  - diet: CC  - contact: Shira Memoracion [phone] updated 1/7  - pharmacy: [facility] in chart  - PCP: Dasha Murphy [phone]  - vaccinated against flu

## 2022-01-11 NOTE — PROGRESS NOTE ADULT - SUBJECTIVE AND OBJECTIVE BOX
Patient is a 71y old  Male who presents with a chief complaint of hypoxia, COVID19 (10 Henry 2022 13:42)      SUBJECTIVE / OVERNIGHT EVENTS: Patient seen and examined at bedside.    MEDICATIONS  (STANDING):  atorvastatin 40 milliGRAM(s) Oral at bedtime  dextrose 40% Gel 15 Gram(s) Oral once  dextrose 5%. 1000 milliLiter(s) (50 mL/Hr) IV Continuous <Continuous>  dextrose 5%. 1000 milliLiter(s) (100 mL/Hr) IV Continuous <Continuous>  dextrose 50% Injectable 25 Gram(s) IV Push once  dextrose 50% Injectable 12.5 Gram(s) IV Push once  dextrose 50% Injectable 25 Gram(s) IV Push once  enoxaparin Injectable 40 milliGRAM(s) SubCutaneous every 12 hours  glucagon  Injectable 1 milliGRAM(s) IntraMuscular once  insulin glargine Injectable (LANTUS) 20 Unit(s) SubCutaneous at bedtime  insulin lispro (ADMELOG) corrective regimen sliding scale   SubCutaneous three times a day before meals  insulin lispro (ADMELOG) corrective regimen sliding scale   SubCutaneous at bedtime  insulin lispro Injectable (ADMELOG) 10 Unit(s) SubCutaneous three times a day before meals  losartan 50 milliGRAM(s) Oral daily  sodium chloride 0.65% Nasal 1 Spray(s) Both Nostrils daily    MEDICATIONS  (PRN):  benzonatate 200 milliGRAM(s) Oral three times a day PRN Cough  guaiFENesin Oral Liquid (Sugar-Free) 100 milliGRAM(s) Oral every 6 hours PRN Cough      Vital Signs Last 24 Hrs  T(C): 36.5 (11 Jan 2022 06:11), Max: 36.8 (10 Henry 2022 20:38)  T(F): 97.7 (11 Jan 2022 06:11), Max: 98.3 (10 Henry 2022 20:38)  HR: 64 (11 Jan 2022 06:11) (63 - 64)  BP: 137/89 (11 Jan 2022 06:11) (137/89 - 139/78)  BP(mean): --  RR: 18 (11 Jan 2022 06:11) (16 - 22)  SpO2: 95% (11 Jan 2022 06:11) (85% - 95%)  CAPILLARY BLOOD GLUCOSE      POCT Blood Glucose.: 332 mg/dL (10 Henry 2022 21:31)  POCT Blood Glucose.: 367 mg/dL (10 Henry 2022 17:23)  POCT Blood Glucose.: 241 mg/dL (10 Henry 2022 12:10)  POCT Blood Glucose.: 241 mg/dL (10 Henry 2022 08:14)    I&O's Summary    10 Henry 2022 07:01  -  11 Jan 2022 07:00  --------------------------------------------------------  IN: 780 mL / OUT: 0 mL / NET: 780 mL        PHYSICAL EXAM:  GENERAL APPEARANCE: Well developed, NAD  HEENT:  PERRL, EOMI. hearing grossly intact.  NECK: Neck supple, non-tender no lymphadenopathy, masses or thyromegaly.  CARDIAC: Normal S1 and S2. no mrg. RRR  LUNGS: Clear to auscultation B/L, no rales, rhonchi, or wheezing  ABDOMEN: Soft , NTND, bowel sounds normal. No guarding or rebound.   MUSCULOSKELETAL: ROM intact.  No joint erythema or tenderness.   EXTREMITIES: No edema. Peripheral pulses intact.   NEUROLOGICAL: Non focal. Strength and sensation symmetric and intact throughout.   SKIN: Warm and dry , Well perfused  PSYCHIATRIC: AOx3 , Normal mood and affect    LABS:                        12.3   12.54 )-----------( 199      ( 10 Henry 2022 06:53 )             35.9     01-10    138  |  104  |  21  ----------------------------<  260<H>  4.1   |  22  |  0.83    Ca    8.6      10 Henry 2022 06:51  Phos  3.4     01-10  Mg     2.2     01-10    TPro  5.7<L>  /  Alb  3.5  /  TBili  0.4  /  DBili  x   /  AST  16  /  ALT  17  /  AlkPhos  49  01-10    PT/INR - ( 10 Henry 2022 06:54 )   PT: 14.1 sec;   INR: 1.18 ratio         PTT - ( 10 Henry 2022 06:54 )  PTT:28.2 sec          RADIOLOGY & ADDITIONAL TESTS:    Imaging Personally Reviewed:    Consultant(s) Notes Reviewed:      Care Discussed with Consultants/Other Providers:    Caity Don, PGY-1; [facility] Pager: [phone]; [facility] Pager: [phone]

## 2022-01-12 ENCOUNTER — APPOINTMENT (OUTPATIENT)
Dept: INTERNAL MEDICINE | Facility: CLINIC | Age: 72
End: 2022-01-12
Payer: MEDICARE

## 2022-01-12 ENCOUNTER — TRANSCRIPTION ENCOUNTER (OUTPATIENT)
Age: 72
End: 2022-01-12

## 2022-01-12 DIAGNOSIS — E11.9 TYPE 2 DIABETES MELLITUS W/OUT COMPLICATIONS: ICD-10-CM

## 2022-01-12 DIAGNOSIS — U07.1 COVID-19: ICD-10-CM

## 2022-01-12 DIAGNOSIS — I10 ESSENTIAL (PRIMARY) HYPERTENSION: ICD-10-CM

## 2022-01-12 PROCEDURE — 99442: CPT | Mod: CS,95

## 2022-01-12 RX ORDER — DEXAMETHASONE 4 MG/1
4 TABLET ORAL DAILY
Refills: 0 | Status: ACTIVE | COMMUNITY
Start: 2022-01-12

## 2022-01-12 RX ORDER — METFORMIN HYDROCHLORIDE 1000 MG/1
1000 TABLET, COATED ORAL
Qty: 180 | Refills: 3 | Status: ACTIVE | COMMUNITY
Start: 2022-01-12

## 2022-01-13 ENCOUNTER — TRANSCRIPTION ENCOUNTER (OUTPATIENT)
Age: 72
End: 2022-01-13

## 2022-01-13 NOTE — HISTORY OF PRESENT ILLNESS
[FreeTextEntry1] : 71 year old unvaccinated male with diabetes, htn who was in the hospital for 5 days requiring oxygen. He did receive remdesivir.  \par He states he is aware of what he needs and knows how to assess himself. \par He did not set up the TEB  so the visit had to be done over the phone which was very limited. \par He states he is not SOB and his pulse ox is 97 on 4LNC when he ambulates and when he is resting. \par He states his sugars are 300 but he is aware that this is high because of the steroids. \par He said he was waiting on instruction from my "agency" to tell him when to stop his oxygen. I told him this isn't really a direction but more so an assessment of how He is doing. he said he has no issues. \par He has no fevers. \par He overall feels well. \par \par Homecare will follow up as per the hospital orders. \par \par we will also fu. \par

## 2022-01-14 ENCOUNTER — TRANSCRIPTION ENCOUNTER (OUTPATIENT)
Age: 72
End: 2022-01-14

## 2022-07-12 NOTE — ED PROVIDER NOTE - PRO INTERPRETER NEED 2
English [place]el[place] Counseling:  I discussed with the patient the risks of ustekinumab including but not limited to immunosuppression, malignancy, posterior leukoencephalopathy syndrome, and serious infections.  The patient understands that monitoring is required including a PPD at baseline and must alert us or the primary physician if symptoms of infection or other concerning signs are noted.

## 2022-07-28 RX ORDER — ROSUVASTATIN CALCIUM 5 MG/1
0 TABLET ORAL
Qty: 0 | Refills: 0 | DISCHARGE

## 2022-07-28 RX ORDER — IRBESARTAN 75 MG/1
1 TABLET ORAL
Qty: 0 | Refills: 0 | DISCHARGE

## 2022-07-28 RX ORDER — METFORMIN HYDROCHLORIDE 850 MG/1
0 TABLET ORAL
Qty: 0 | Refills: 0 | DISCHARGE

## 2022-07-28 RX ORDER — METFORMIN HYDROCHLORIDE 850 MG/1
1 TABLET ORAL
Qty: 0 | Refills: 0 | DISCHARGE

## 2022-07-28 RX ORDER — ROSUVASTATIN CALCIUM 5 MG/1
1 TABLET ORAL
Qty: 0 | Refills: 0 | DISCHARGE

## 2022-07-28 RX ORDER — IRBESARTAN 75 MG/1
0 TABLET ORAL
Qty: 0 | Refills: 0 | DISCHARGE

## 2022-11-28 ENCOUNTER — NON-APPOINTMENT (OUTPATIENT)
Age: 72
End: 2022-11-28

## 2023-06-19 NOTE — PATIENT PROFILE ADULT - NSTRANSFERBELONGINGSDISPO_GEN_A_NUR
· given to family

## 2024-01-25 NOTE — ED PROVIDER NOTE - INCLUDE COVID-19 DISCHARGE INSTRUCTIONS
The laboratory contacted the ED stating that the patient's stool was positive for Salmonella.  I did consider treating with antibiotics however indication is immunocompromised state extreme of ages and severe infection.  The patient does not meet any of this criteria.  Antibiotics use otherwise may prolong carious teeth of the infection and therefore no antibiotics were prescribed.   <-------- Click here to INCLUDE CoVID-19 Discharge Instructions
